# Patient Record
Sex: FEMALE | Race: WHITE | Employment: OTHER | URBAN - METROPOLITAN AREA
[De-identification: names, ages, dates, MRNs, and addresses within clinical notes are randomized per-mention and may not be internally consistent; named-entity substitution may affect disease eponyms.]

---

## 2018-01-03 PROBLEM — K43.2 INCISIONAL HERNIA, WITHOUT OBSTRUCTION OR GANGRENE: Status: ACTIVE | Noted: 2018-01-03

## 2018-02-05 PROBLEM — R10.11 CHRONIC RIGHT UPPER QUADRANT PAIN: Status: ACTIVE | Noted: 2018-02-05

## 2018-02-05 PROBLEM — G89.29 CHRONIC RIGHT UPPER QUADRANT PAIN: Status: ACTIVE | Noted: 2018-02-05

## 2018-07-09 ENCOUNTER — TELEPHONE (OUTPATIENT)
Dept: SURGERY | Age: 68
End: 2018-07-09

## 2018-07-09 ENCOUNTER — OFFICE VISIT (OUTPATIENT)
Dept: FAMILY MEDICINE CLINIC | Age: 68
End: 2018-07-09
Payer: MEDICARE

## 2018-07-09 ENCOUNTER — HOSPITAL ENCOUNTER (OUTPATIENT)
Age: 68
Discharge: HOME | End: 2018-07-11
Payer: MEDICARE

## 2018-07-09 VITALS
WEIGHT: 172 LBS | RESPIRATION RATE: 16 BRPM | DIASTOLIC BLOOD PRESSURE: 81 MMHG | HEART RATE: 76 BPM | BODY MASS INDEX: 32.47 KG/M2 | TEMPERATURE: 98.1 F | SYSTOLIC BLOOD PRESSURE: 134 MMHG | HEIGHT: 61 IN | OXYGEN SATURATION: 97 %

## 2018-07-09 DIAGNOSIS — I10 ESSENTIAL HYPERTENSION: ICD-10-CM

## 2018-07-09 DIAGNOSIS — E78.2 MIXED HYPERLIPIDEMIA: Primary | ICD-10-CM

## 2018-07-09 DIAGNOSIS — E78.2 MIXED HYPERLIPIDEMIA: ICD-10-CM

## 2018-07-09 DIAGNOSIS — K21.9 GASTROESOPHAGEAL REFLUX DISEASE, ESOPHAGITIS PRESENCE NOT SPECIFIED: ICD-10-CM

## 2018-07-09 DIAGNOSIS — R13.19 ESOPHAGEAL DYSPHAGIA: ICD-10-CM

## 2018-07-09 LAB
ALBUMIN SERPL-MCNC: 4.2 G/DL (ref 3.5–5.2)
ALP BLD-CCNC: 80 U/L (ref 35–104)
ALT SERPL-CCNC: 16 U/L (ref 0–32)
ANION GAP SERPL CALCULATED.3IONS-SCNC: 18 MMOL/L (ref 7–16)
AST SERPL-CCNC: 28 U/L (ref 0–31)
BILIRUB SERPL-MCNC: 0.7 MG/DL (ref 0–1.2)
BUN BLDV-MCNC: 21 MG/DL (ref 8–23)
CALCIUM SERPL-MCNC: 9.3 MG/DL (ref 8.6–10.2)
CHLORIDE BLD-SCNC: 101 MMOL/L (ref 98–107)
CHOLESTEROL, TOTAL: 162 MG/DL (ref 0–199)
CO2: 21 MMOL/L (ref 22–29)
CREAT SERPL-MCNC: 1 MG/DL (ref 0.5–1)
GFR AFRICAN AMERICAN: >60
GFR NON-AFRICAN AMERICAN: 55 ML/MIN/1.73
GLUCOSE BLD-MCNC: 83 MG/DL (ref 74–109)
HCT VFR BLD CALC: 42.9 % (ref 34–48)
HDLC SERPL-MCNC: 39 MG/DL
HEMOGLOBIN: 14.3 G/DL (ref 11.5–15.5)
LDL CHOLESTEROL CALCULATED: 86 MG/DL (ref 0–99)
MCH RBC QN AUTO: 31.4 PG (ref 26–35)
MCHC RBC AUTO-ENTMCNC: 33.3 % (ref 32–34.5)
MCV RBC AUTO: 94.1 FL (ref 80–99.9)
PDW BLD-RTO: 13.6 FL (ref 11.5–15)
PLATELET # BLD: 260 E9/L (ref 130–450)
PMV BLD AUTO: 9.9 FL (ref 7–12)
POTASSIUM SERPL-SCNC: 4.5 MMOL/L (ref 3.5–5)
RBC # BLD: 4.56 E12/L (ref 3.5–5.5)
SODIUM BLD-SCNC: 140 MMOL/L (ref 132–146)
TOTAL PROTEIN: 7.9 G/DL (ref 6.4–8.3)
TRIGL SERPL-MCNC: 186 MG/DL (ref 0–149)
TSH SERPL DL<=0.05 MIU/L-ACNC: 1.79 UIU/ML (ref 0.27–4.2)
VLDLC SERPL CALC-MCNC: 37 MG/DL
WBC # BLD: 7 E9/L (ref 4.5–11.5)

## 2018-07-09 PROCEDURE — 4040F PNEUMOC VAC/ADMIN/RCVD: CPT | Performed by: NURSE PRACTITIONER

## 2018-07-09 PROCEDURE — 84443 ASSAY THYROID STIM HORMONE: CPT

## 2018-07-09 PROCEDURE — G8417 CALC BMI ABV UP PARAM F/U: HCPCS | Performed by: NURSE PRACTITIONER

## 2018-07-09 PROCEDURE — 80061 LIPID PANEL: CPT

## 2018-07-09 PROCEDURE — 3017F COLORECTAL CA SCREEN DOC REV: CPT | Performed by: NURSE PRACTITIONER

## 2018-07-09 PROCEDURE — 1123F ACP DISCUSS/DSCN MKR DOCD: CPT | Performed by: NURSE PRACTITIONER

## 2018-07-09 PROCEDURE — 80053 COMPREHEN METABOLIC PANEL: CPT

## 2018-07-09 PROCEDURE — G8427 DOCREV CUR MEDS BY ELIG CLIN: HCPCS | Performed by: NURSE PRACTITIONER

## 2018-07-09 PROCEDURE — 99214 OFFICE O/P EST MOD 30 MIN: CPT | Performed by: NURSE PRACTITIONER

## 2018-07-09 PROCEDURE — 1090F PRES/ABSN URINE INCON ASSESS: CPT | Performed by: NURSE PRACTITIONER

## 2018-07-09 PROCEDURE — 85027 COMPLETE CBC AUTOMATED: CPT

## 2018-07-09 PROCEDURE — 1036F TOBACCO NON-USER: CPT | Performed by: NURSE PRACTITIONER

## 2018-07-09 PROCEDURE — G8400 PT W/DXA NO RESULTS DOC: HCPCS | Performed by: NURSE PRACTITIONER

## 2018-07-09 RX ORDER — AMLODIPINE BESYLATE 5 MG/1
TABLET ORAL
Qty: 30 TABLET | Refills: 0
Start: 2018-07-09 | End: 2019-01-24 | Stop reason: SDUPTHER

## 2018-07-09 RX ORDER — PANTOPRAZOLE SODIUM 20 MG/1
20 TABLET, DELAYED RELEASE ORAL DAILY
Qty: 30 TABLET | Refills: 3 | Status: SHIPPED | OUTPATIENT
Start: 2018-07-09 | End: 2018-10-03 | Stop reason: SDUPTHER

## 2018-07-09 NOTE — PROGRESS NOTES
activity change, appetite change, chills, diaphoresis, fatigue, fever and unexpected weight change. HENT: Positive for trouble swallowing. Negative for congestion, drooling, hearing loss, mouth sores, nosebleeds and voice change. Eyes: Negative for photophobia, pain, discharge, redness, itching and visual disturbance. Respiratory: Negative for apnea, cough, choking, chest tightness, shortness of breath, wheezing and stridor. Cardiovascular: Negative for chest pain, palpitations and leg swelling. Gastrointestinal: Positive for abdominal pain (epigastric tenderness). Negative for abdominal distention, anal bleeding, blood in stool, constipation, diarrhea, nausea, rectal pain and vomiting. C/o  GERD   Endocrine: Negative for cold intolerance, heat intolerance, polydipsia, polyphagia and polyuria. Genitourinary: Negative for decreased urine volume, difficulty urinating, dysuria, enuresis, flank pain, frequency, hematuria and urgency. Musculoskeletal: Negative for arthralgias, joint swelling and myalgias. Skin: Negative for color change, pallor and rash. Neurological: Negative for dizziness, tremors, seizures, syncope, facial asymmetry, speech difficulty, weakness, light-headedness, numbness and headaches. H/o MS-in remission   Hematological: Negative for adenopathy. Does not bruise/bleed easily. Psychiatric/Behavioral: Negative for agitation, behavioral problems, confusion, decreased concentration, dysphoric mood, hallucinations, self-injury, sleep disturbance and suicidal ideas. The patient is not nervous/anxious and is not hyperactive.         Physical Exam:    VS:  /81 (Site: Left Arm, Position: Sitting, Cuff Size: Medium Adult)   Pulse 76   Temp 98.1 °F (36.7 °C) (Oral)   Resp 16   Ht 5' 1\" (1.549 m)   Wt 172 lb (78 kg)   SpO2 97%   BMI 32.50 kg/m²   LAST WEIGHT:  Wt Readings from Last 3 Encounters:   07/09/18 172 lb (78 kg)   02/20/18 170 lb (77.1 kg)   01/03/18 173

## 2018-07-09 NOTE — TELEPHONE ENCOUNTER
Patient was referred by Katelynn Pringle CNP for esophageal dysphagia, GERD consult. Patient was scheduled on 08/29/18 in [facility] office @ 1:00pm with Dr. Danielle Esquivel. Patient instructed that information packet with appointment letter and health questionnaire will be put in the mail today. Patient instructed to complete health questionnaire prior to scheduled appointment and use return envelope in packet to mail it in; if patient cannot mail it back to us prior to appointment, pt instructed to bring it with her to scheduled appt on 08/29/18. Patient also instructed that a co-pay is due at the time of visit, and if the patient is not insured there is a $40.00 fee at the time of the appointment. Patient was instructed to bring a photo ID, insurance card (if applicable), and list of any current medications. Patient instructed to arrive 15 minutes prior for registration. Patient verbalized understanding of instructions.     Electronically signed by Andrew Mendez CMA on 7/9/18 at 3:05 PM

## 2018-07-09 NOTE — PATIENT INSTRUCTIONS
Patient Education        Learning About the Diet for Swallowing Problems  What are swallowing problems? Difficulty swallowing is also called dysphagia (say \"ajw-EMP-hdr-uh\"). It is most often a sign of a problem with your throat or esophagus. This is the tube that moves food and liquids from the back of your mouth to your stomach. Trouble swallowing can occur when the muscles and nerves that move food through the throat and esophagus are not working right. To help you swallow food, your doctor or speech therapist may advise a special dysphagia diet for you. Why is a special diet important? A dysphagia diet can help you handle some problems that can occur when it's hard to swallow food and liquids easily. These problems can include:  · Malnutrition. This means you aren't getting enough healthy foods to keep your body working well. · Dehydration. This means you aren't getting enough liquids to keep your body healthy. · Aspiration. This means that food, liquid, or saliva goes down your windpipe (trachea) into your lungs, instead of down your esophagus to your stomach. This can lead to aspiration pneumonia, which is an inflammation of the lungs. What is the dysphagia diet? In the dysphagia diet, you change the foods you eat and the liquids you drink to make it easier to swallow them. You may:  · Change the texture of the foods you eat. Your doctor or speech therapist may advise you to eat one of these types of foods:  ¨ Solid, soft foods that have been cut up into small pieces. Extra gravy or sauce can help make these foods easier to swallow. ¨ Semi-solid foods, like ground meats or fruits and vegetables that are mashed with a fork. These foods require some chewing. Extra gravy or sauce is often served. ¨ Foods that are the texture of pudding. You don't have to chew these foods. Examples include mashed potatoes with gravy; yogurt; pudding; and pureed meats, fruits, and vegetables.   · Thicken the liquids you drink. Your doctor or speech therapist will tell you what kind of thickener to use and how thick to make the liquids. ¨ Nectar-thick liquids leave a thin coating when they are poured from a spoon. ¨ Honey-thick liquids drip slowly when they are poured from a spoon. ¨ Pudding-thick liquids do not pour from a spoon. Your speech therapist will help you learn exercises to train your muscles to work together so you can swallow. You may also need to learn how to position your body or how to put food in your mouth to be able to swallow better. Some people have severe trouble swallowing and can't get enough food and liquids. In those cases, the doctor can insert a feeding tube so the person gets enough nutrients. Follow-up care is a key part of your treatment and safety. Be sure to make and go to all appointments, and call your doctor if you are having problems. It's also a good idea to know your test results and keep a list of the medicines you take. Where can you learn more? Go to [email or web address] org and sign in to your [employer] account. Enter J477 in the [facility] box to learn more about \"Learning About the Diet for Swallowing Problems. \"     If you do not have an account, please click on the \"Sign Up Now\" link. Current as of: November 20, 2017  Content Version: 11.6  © 8064-2507 [employer]. Care instructions adapted under license by [facility] ([facility]). If you have questions about a medical condition or this instruction, always ask your healthcare professional. [place] [address] any warranty or liability for your use of this information. Patient Education        Learning About Swallowing Problems  What are swallowing problems? Certain health problems that affect the nervous system can cause trouble swallowing. These conditions include stroke, ALS (also known as Rosalina Gehrig's disease), Parkinson's disease, and multiple sclerosis.  The muscles and you think you are having a problem with your medicine. · Talk to your doctor before you start taking aspirin every day. Aspirin can help certain people lower their risk of a heart attack or stroke. But taking aspirin isn't right for everyone, because it can cause serious bleeding. · See your doctor regularly. You may need to see the doctor more often at first or until your blood pressure comes down. · If you are taking blood pressure medicine, talk to your doctor before you take decongestants or anti-inflammatory medicine, such as ibuprofen. Some of these medicines can raise blood pressure. · Learn how to check your blood pressure at home. Lifestyle changes  · Stay at a healthy weight. This is especially important if you put on weight around the waist. Losing even 10 pounds can help you lower your blood pressure. · If your doctor recommends it, get more exercise. Walking is a good choice. Bit by bit, increase the amount you walk every day. Try for at least 30 minutes on most days of the week. You also may want to swim, bike, or do other activities. · Avoid or limit alcohol. Talk to your doctor about whether you can drink any alcohol. · Try to limit how much sodium you eat to less than 2,300 milligrams (mg) a day. Your doctor may ask you to try to eat less than 1,500 mg a day. · Eat plenty of fruits (such as bananas and oranges), vegetables, legumes, whole grains, and low-fat dairy products. · Lower the amount of saturated fat in your diet. Saturated fat is found in animal products such as milk, cheese, and meat. Limiting these foods may help you lose weight and also lower your risk for heart disease. · Do not smoke. Smoking increases your risk for heart attack and stroke. If you need help quitting, talk to your doctor about stop-smoking programs and medicines. These can increase your chances of quitting for good. When should you call for help? Call 911 anytime you think you may need emergency care.  This about \"High Blood Pressure: Care Instructions. \"     If you do not have an account, please click on the \"Sign Up Now\" link. Current as of: December 6, 2017  Content Version: 11.6  © [id number] [employer]. Care instructions adapted under license by [facility] ([facility]). If you have questions about a medical condition or this instruction, always ask your healthcare professional. [place] [address] any warranty or liability for your use of this information. Patient Education        High Cholesterol: Care Instructions  Your Care Instructions    Cholesterol is a type of fat in your blood. It is needed for many body functions, such as making new cells. Cholesterol is made by your body. It also comes from food you eat. High cholesterol means that you have too much of the fat in your blood. This raises your risk of a heart attack and stroke. LDL and HDL are part of your total cholesterol. LDL is the \"bad\" cholesterol. High LDL can raise your risk for heart disease, heart attack, and stroke. HDL is the \"good\" cholesterol. It helps clear bad cholesterol from the body. High HDL is linked with a lower risk of heart disease, heart attack, and stroke. Your cholesterol levels help your doctor find out your risk for having a heart attack or stroke. You and your doctor can talk about whether you need to lower your risk and what treatment is best for you. A heart-healthy lifestyle along with medicines can help lower your cholesterol and your risk. The way you choose to lower your risk will depend on how high your risk is for heart attack and stroke. It will also depend on how you feel about taking medicines. Follow-up care is a key part of your treatment and safety. Be sure to make and go to all appointments, and call your doctor if you are having problems. It's also a good idea to know your test results and keep a list of the medicines you take. How can you care for yourself at home?   · Eat a variety

## 2018-07-10 ASSESSMENT — ENCOUNTER SYMPTOMS
CHEST TIGHTNESS: 0
RECTAL PAIN: 0
COLOR CHANGE: 0
ANAL BLEEDING: 0
BLOOD IN STOOL: 0
APNEA: 0
EYE REDNESS: 0
ABDOMINAL DISTENTION: 0
EYE PAIN: 0
DIARRHEA: 0
EYE ITCHING: 0
CHOKING: 0
TROUBLE SWALLOWING: 1
WHEEZING: 0
STRIDOR: 0
CONSTIPATION: 0
VOICE CHANGE: 0
VOMITING: 0
COUGH: 0
NAUSEA: 0
PHOTOPHOBIA: 0
EYE DISCHARGE: 0
SHORTNESS OF BREATH: 0
ABDOMINAL PAIN: 1

## 2018-07-13 ENCOUNTER — TELEPHONE (OUTPATIENT)
Dept: FAMILY MEDICINE CLINIC | Age: 68
End: 2018-07-13

## 2018-07-16 DIAGNOSIS — E78.00 PURE HYPERCHOLESTEROLEMIA: ICD-10-CM

## 2018-07-16 RX ORDER — PAROXETINE 30 MG/1
TABLET, FILM COATED ORAL
Qty: 90 TABLET | Refills: 1 | Status: SHIPPED | OUTPATIENT
Start: 2018-07-16 | End: 2019-02-08 | Stop reason: ALTCHOICE

## 2018-07-16 RX ORDER — MELOXICAM 15 MG/1
TABLET ORAL
Qty: 90 TABLET | Refills: 1 | Status: SHIPPED | OUTPATIENT
Start: 2018-07-16 | End: 2019-02-13

## 2018-07-16 RX ORDER — SIMVASTATIN 40 MG
40 TABLET ORAL NIGHTLY
Qty: 90 TABLET | Refills: 1 | Status: SHIPPED | OUTPATIENT
Start: 2018-07-16 | End: 2019-02-13 | Stop reason: SDUPTHER

## 2018-08-29 ENCOUNTER — OFFICE VISIT (OUTPATIENT)
Dept: SURGERY | Age: 68
End: 2018-08-29
Payer: MEDICARE

## 2018-08-29 VITALS
RESPIRATION RATE: 17 BRPM | HEART RATE: 88 BPM | BODY MASS INDEX: 32.85 KG/M2 | WEIGHT: 174 LBS | HEIGHT: 61 IN | DIASTOLIC BLOOD PRESSURE: 102 MMHG | SYSTOLIC BLOOD PRESSURE: 160 MMHG | OXYGEN SATURATION: 96 %

## 2018-08-29 DIAGNOSIS — K43.9 HERNIA OF ANTERIOR ABDOMINAL WALL: Primary | ICD-10-CM

## 2018-08-29 DIAGNOSIS — K21.9 GASTROESOPHAGEAL REFLUX DISEASE, ESOPHAGITIS PRESENCE NOT SPECIFIED: ICD-10-CM

## 2018-08-29 PROCEDURE — 1036F TOBACCO NON-USER: CPT | Performed by: SURGERY

## 2018-08-29 PROCEDURE — G8400 PT W/DXA NO RESULTS DOC: HCPCS | Performed by: SURGERY

## 2018-08-29 PROCEDURE — 1090F PRES/ABSN URINE INCON ASSESS: CPT | Performed by: SURGERY

## 2018-08-29 PROCEDURE — G8417 CALC BMI ABV UP PARAM F/U: HCPCS | Performed by: SURGERY

## 2018-08-29 PROCEDURE — 3017F COLORECTAL CA SCREEN DOC REV: CPT | Performed by: SURGERY

## 2018-08-29 PROCEDURE — 4040F PNEUMOC VAC/ADMIN/RCVD: CPT | Performed by: SURGERY

## 2018-08-29 PROCEDURE — 1123F ACP DISCUSS/DSCN MKR DOCD: CPT | Performed by: SURGERY

## 2018-08-29 PROCEDURE — 99204 OFFICE O/P NEW MOD 45 MIN: CPT | Performed by: SURGERY

## 2018-08-29 PROCEDURE — 1101F PT FALLS ASSESS-DOCD LE1/YR: CPT | Performed by: SURGERY

## 2018-08-29 PROCEDURE — G8427 DOCREV CUR MEDS BY ELIG CLIN: HCPCS | Performed by: SURGERY

## 2018-08-29 NOTE — PATIENT INSTRUCTIONS
explain your symptoms. You and your doctor will talk about the results and your treatment plan. Call Your Doctor   After the test, call your doctor if any of the following occurs:   Signs of infection, including fever and chills   Severe abdominal pain   Hard, swollen abdomen   Difficulty swallowing or breathing   Any change or increase in your original symptoms   Bloody or black tarry colored stools   Nausea and/or vomiting   Cough, shortness of breath, or chest pain   Bleeding     In case of emergency, call 911.

## 2018-08-29 NOTE — PROGRESS NOTES
[facility]    General Surgery Attending History and Physical    Patient's Name/Date of Birth: Ulises Cedeño / 1950 (57 y.o.)    Date: August 29, 2018     CC: incisional hernia    HPI:  80 yo complains of hernia. She notes that she has had this hernia on her abdomen x 5 years. No abdominal pain related to the hernia. She has put on 20 pounds since quitting smoking 1 year ago. Over the past year, she has noted that the incisional hernia has increased in size. Since starting on protonix 2 months ago, she has no symptoms from her heartburn. She has no burning in her chest. She can lay down and not feel nauseated. She had prior diverticulitis surgery by dr Janeth Allen 12 years ago. She has never had a colonoscopy and declines to have one. She has a family hx of esophageal cancer (father)  No weight loss.   No blood per rectum  No change in appetite    Past Medical History:   Diagnosis Date    Allergic rhinitis     Anxiety     Dyslexia     GERD (gastroesophageal reflux disease)     Hyperlipidemia     Hypertension     Multiple sclerosis ([facility] [address] 1980    Osteoporosis     Sleep disorder        Past Surgical History:   Procedure Laterality Date    COLECTOMY      diverticulitis     TONSILLECTOMY AND ADENOIDECTOMY      TUBAL LIGATION      WISDOM TOOTH EXTRACTION         Current Outpatient Prescriptions   Medication Sig Dispense Refill    simvastatin (ZOCOR) 40 MG tablet Take 1 tablet by mouth nightly 90 tablet 1    PARoxetine (PAXIL) 30 MG tablet take 1 tablet by mouth once daily 90 tablet 1    meloxicam (MOBIC) 15 MG tablet take 1 tablet by mouth once daily if needed for pain 90 tablet 1    pantoprazole (PROTONIX) 20 MG tablet Take 1 tablet by mouth daily 30 tablet 3    amLODIPine (NORVASC) 5 MG tablet 1/2 tab po daily 30 tablet 0    diphenhydrAMINE (BENADRYL) 25 MG capsule Take 25 mg by mouth every 6 hours as needed for Itching      varenicline (CHANTIX CONTINUING MONTH JOSE G) 1 Breath sounds were clear and equal with no rales, wheezes, or rhonchi. Respiratory effort was normal with no retractions or use of accessory muscles. CV: Heart sounds were normal with a regular rate and rhythm. No pedal edema  GI/ Abdomen: The abdomen was soft and non distended. There was no tenderness, guarding, rebound, or rigidity. There was no                     masses, hepatosplenomegaly. Upper midline--reducible incisional hernia  No inguinal hernias were noted on coughing and straining. MSK: no clubbing/ no cyanosis/ gait normal       Assessment/Plan:  --Reducible incisional hernia--increased in size. Pt is unsure if she wants it fixed. Will observe for now    --Esophageal reflux--sx improved with protonix  Family hx of esophageal cancer  Plan on EGD. I have discussed the risks, benefits, and alternatives to esophagogastroduodenoscopy with possible biopsy with deep sedation with the patient. I have detailed the risks of deep sedation (hypotension, hypoxia) as well as complications of bleeding and perforation. The patient understands the above and agrees to proceed. --No prior colonoscopy.   Counseled pt on the importance of having a colonoscopy but she declines         Kecia Choe MD, FACS  8/29/2018  1:42 PM

## 2018-08-29 NOTE — PROGRESS NOTES
Kenny Rojo is scheduled for an EGD with Dr Karen Cunningham on 9/26/18 @ 10:30am @ [facility]. Patient has been notified of the appointment and a letter has been mailed and/or given to patient in clinic. Patient has also been given verbal instruction to stop blood thinners 5 days before, take blood pressure medicine the morning before with a small sip of water. Patient has been told to make sure they have a ride and to park in the [facility] and report through the outpatient entrance of the hospital facing [place] for same day surgery.     Electronically signed by Dariusz Oconnor on 8/29/18 at 1:55 PM

## 2018-09-25 NOTE — PROGRESS NOTES
[facility] PRE-ADMISSION TESTING GENERAL INSTRUCTIONS- [facility]-phone number:[phone]    GENERAL INSTRUCTIONS      LEFT INSTRUCTIONS ON PATIENTS PHONE ANSWERING MACHINE 09/25/2018 1000    [x] Antibacterial Soap shower Night before and/or AM of Surgery  [] Jerome wipe instruction sheet and wipes given. [x] Nothing by mouth after midnight, including gum, candy, mints, or water.   [] You may brush your teeth, gargle, but do NOT swallow water. []Hibiclens shower  the night before and the morning of surgery. Do not use             Hibiclens on your face or head. [x]No smoking, chewing tobacco, illegal drugs, or alcohol within 24 hours of your surgery. [x] Jewelry, valuables or body piercing's should not be brought to the hospital. All body and/or tongue piercing's must be removed prior to arriving to hospital.  ALL hair pins must be removed. [x] Do not wear makeup, lotions, powders, deodorant. Nail polish as directed by the nurse. [x] Arrange transportation to and from the hospital.  Arrange for someone to be with you for the remainder of the day and for 24 hours after your procedure due to having had anesthesia. [x] Bring insurance card and photo ID.  [] Transfusion Bracelet: Please bring with you to hospital, day of surgery  [] Bring urine specimen day of surgery. Any small container is acceptable. [] Use inhalers the morning of surgery and bring with you to hospital.   []Bring copy of living will or healthcare power of  papers to be placed in your electronic record. [] CPAP/BI-PAP: Please bring your machine if you are to spend the night in the hospital.     ENDOSCOPY INSTRUCTIONS:   [] Bowel prep instructions reviewed. [] Nothing by mouth after midnight, including gum, candy, mints, or water.  [] You may brush your teeth, gargle, but do NOT swallow water. [] Do not wear makeup, lotions, powders, deodorant. Nail polish as directed by the nurse.   [] Arrange symptomatic, you may drink 1-2 ounces of apple juice or take a glucose tablet. The morning of your procedure, you may call the pre-op area if you have concerns about your blood sugar [phone]. [] Use your inhalers the morning of surgery. Bring your emergency inhaler with you day of surgery. [] Follow physician instructions regarding any blood thinners you may be taking. WHAT TO EXPECT:  [] The day of surgery you will be greeted and  checked in by the [employer] .  A nurse will greet you in accordance to the time you are needed in the pre-op area to prepare you for surgery. Please do not be discouraged if you are not greeted in the order you arrive as there are many variables that are involved in patient preparation. Your patience is greatly appreciated as you wait for your nurse. Please bring in items such as: books, magazines, newspapers, electronics, or any other items  to occupy your time in the waiting area. []  Delays may occur with surgery and staff will make a sincere effort to keep you informed of delays. If any delays occur with your procedure, we apologize ahead of time for your inconvenience as we recognize the value of your time.

## 2018-09-26 ENCOUNTER — ANESTHESIA (OUTPATIENT)
Dept: ENDOSCOPY | Age: 68
End: 2018-09-26
Payer: MEDICARE

## 2018-09-26 ENCOUNTER — HOSPITAL ENCOUNTER (OUTPATIENT)
Age: 68
Setting detail: OUTPATIENT SURGERY
Discharge: HOME | End: 2018-09-26
Attending: SURGERY | Admitting: SURGERY
Payer: MEDICARE

## 2018-09-26 ENCOUNTER — ANESTHESIA EVENT (OUTPATIENT)
Dept: ENDOSCOPY | Age: 68
End: 2018-09-26
Payer: MEDICARE

## 2018-09-26 VITALS
HEIGHT: 61 IN | OXYGEN SATURATION: 100 % | BODY MASS INDEX: 32.85 KG/M2 | SYSTOLIC BLOOD PRESSURE: 150 MMHG | RESPIRATION RATE: 18 BRPM | TEMPERATURE: 97 F | HEART RATE: 76 BPM | WEIGHT: 174 LBS | DIASTOLIC BLOOD PRESSURE: 88 MMHG

## 2018-09-26 VITALS
DIASTOLIC BLOOD PRESSURE: 123 MMHG | RESPIRATION RATE: 10 BRPM | OXYGEN SATURATION: 93 % | SYSTOLIC BLOOD PRESSURE: 196 MMHG

## 2018-09-26 PROCEDURE — 88305 TISSUE EXAM BY PATHOLOGIST: CPT

## 2018-09-26 PROCEDURE — 3609012400 HC EGD TRANSORAL BIOPSY SINGLE/MULTIPLE: Performed by: SURGERY

## 2018-09-26 PROCEDURE — 7100000010 HC PHASE II RECOVERY - FIRST 15 MIN: Performed by: SURGERY

## 2018-09-26 PROCEDURE — 6360000002 HC RX W HCPCS: Performed by: PHYSICIAN ASSISTANT

## 2018-09-26 PROCEDURE — 3700000000 HC ANESTHESIA ATTENDED CARE: Performed by: SURGERY

## 2018-09-26 PROCEDURE — 2580000003 HC RX 258: Performed by: SURGERY

## 2018-09-26 PROCEDURE — 3700000001 HC ADD 15 MINUTES (ANESTHESIA): Performed by: SURGERY

## 2018-09-26 PROCEDURE — 88342 IMHCHEM/IMCYTCHM 1ST ANTB: CPT

## 2018-09-26 PROCEDURE — 43239 EGD BIOPSY SINGLE/MULTIPLE: CPT | Performed by: SURGERY

## 2018-09-26 PROCEDURE — 7100000011 HC PHASE II RECOVERY - ADDTL 15 MIN: Performed by: SURGERY

## 2018-09-26 RX ORDER — PROPOFOL 10 MG/ML
INJECTION, EMULSION INTRAVENOUS PRN
Status: DISCONTINUED | OUTPATIENT
Start: 2018-09-26 | End: 2018-09-26 | Stop reason: SDUPTHER

## 2018-09-26 RX ORDER — SODIUM CHLORIDE 9 MG/ML
INJECTION, SOLUTION INTRAVENOUS CONTINUOUS
Status: DISCONTINUED | OUTPATIENT
Start: 2018-09-26 | End: 2018-09-26 | Stop reason: HOSPADM

## 2018-09-26 RX ORDER — 0.9 % SODIUM CHLORIDE 0.9 %
10 VIAL (ML) INJECTION PRN
Status: DISCONTINUED | OUTPATIENT
Start: 2018-09-26 | End: 2018-09-26 | Stop reason: HOSPADM

## 2018-09-26 RX ORDER — 0.9 % SODIUM CHLORIDE 0.9 %
10 VIAL (ML) INJECTION EVERY 12 HOURS SCHEDULED
Status: DISCONTINUED | OUTPATIENT
Start: 2018-09-26 | End: 2018-09-26 | Stop reason: HOSPADM

## 2018-09-26 RX ADMIN — SODIUM CHLORIDE: 9 INJECTION, SOLUTION INTRAVENOUS at 10:33

## 2018-09-26 RX ADMIN — PROPOFOL 150 MG: 10 INJECTION, EMULSION INTRAVENOUS at 10:45

## 2018-09-26 ASSESSMENT — PULMONARY FUNCTION TESTS
PIF_VALUE: 0

## 2018-09-26 ASSESSMENT — PAIN SCALES - GENERAL
PAINLEVEL_OUTOF10: 0

## 2018-09-26 ASSESSMENT — PAIN - FUNCTIONAL ASSESSMENT: PAIN_FUNCTIONAL_ASSESSMENT: 0-10

## 2018-09-26 NOTE — ANESTHESIA POSTPROCEDURE EVALUATION
Department of Anesthesiology  Postprocedure Note    Patient: Arcadio Or  MRN: [id number]  YOB: 1950  Date of evaluation: 9/26/2018  Time:  12:36 PM     Procedure Summary     Date:  09/26/18 Room / Location:  [facility] ENDO 03 / [facility]    Anesthesia Start:  1384 Anesthesia Stop:  1055    Procedure:  EGD BIOPSY (N/A ) Diagnosis:  (FAMILY HX OF ESOPHAGEAL CA, REFLUX)    Surgeon:  Serjio Milligan MD Responsible Provider:  Leonela Ji MD    Anesthesia Type:  MAC ASA Status:  3          Anesthesia Type: MAC    Ben Phase I: Ben Score: 10    Ben Phase II: Ben Score: 10    Last vitals: Reviewed and per EMR flowsheets.        Anesthesia Post Evaluation    Patient location during evaluation: PACU  Patient participation: complete - patient participated  Level of consciousness: awake  Pain score: 2  Airway patency: patent  Nausea & Vomiting: no nausea  Complications: no  Cardiovascular status: blood pressure returned to baseline  Respiratory status: acceptable  Hydration status: euvolemic

## 2018-09-26 NOTE — ANESTHESIA PRE PROCEDURE
Body mass index is 32.88 kg/m². CBC:   Lab Results   Component Value Date    WBC 7.0 07/09/2018    RBC 4.56 07/09/2018    HGB 14.3 07/09/2018    HCT 42.9 07/09/2018    MCV 94.1 07/09/2018    RDW 13.6 07/09/2018     07/09/2018       CMP:   Lab Results   Component Value Date     07/09/2018    K 4.5 07/09/2018     07/09/2018    CO2 21 07/09/2018    BUN 21 07/09/2018    CREATININE 1.0 07/09/2018    GFRAA >60 07/09/2018    LABGLOM 55 07/09/2018    GLUCOSE 83 07/09/2018    PROT 7.9 07/09/2018    CALCIUM 9.3 07/09/2018    BILITOT 0.7 07/09/2018    ALKPHOS 80 07/09/2018    AST 28 07/09/2018    ALT 16 07/09/2018       POC Tests: No results for input(s): POCGLU, POCNA, POCK, POCCL, POCBUN, POCHEMO, POCHCT in the last 72 hours. Coags: No results found for: PROTIME, INR, APTT    HCG (If Applicable): No results found for: PREGTESTUR, PREGSERUM, HCG, HCGQUANT     ABGs: No results found for: PHART, PO2ART, ZTT8SMD, BGX1AKM, BEART, N1IPVVQJ     Type & Screen (If Applicable):  No results found for: LABABO, [address]    Anesthesia Evaluation  Patient summary reviewed and Nursing notes reviewed no history of anesthetic complications:   Airway: Mallampati: III  TM distance: >3 FB   Neck ROM: full  Mouth opening: > = 3 FB Dental:      Comment: Denies loose, chipped, cracked teeth     Pulmonary:       Smoker: quit smoking in January 2018. Cardiovascular:    (+) hypertension:, hyperlipidemia                  Neuro/Psych:                ROS comment: MS GI/Hepatic/Renal:   (+) GERD (on medication ): well controlled,          ROS comment: Incisional hernia - from surgery 10 yrs ago . Endo/Other:                      ROS comment: Osteoporosis  Abdominal:           Vascular:                                      Anesthesia Plan      MAC     ASA 3       Induction: intravenous. Anesthetic plan and risks discussed with patient.       Plan discussed with CRNA and attending.                   Hermann Prescott RN   9/26/2018

## 2018-09-26 NOTE — H&P
[address] [facility] [place] [facility] [address]    General Surgery Attending History and Physical    Patient's Name/Date of Birth: Branden Berg / 1950 (62 y.o.)    Date: September 26, 2018     CC: incisional hernia    HPI:  80 yo complains of hernia. She notes that she has had this hernia on her abdomen x 5 years. No abdominal pain related to the hernia. She has put on 20 pounds since quitting smoking 1 year ago. Over the past year, she has noted that the incisional hernia has increased in size. Since starting on protonix 2 months ago, she has no symptoms from her heartburn. She has no burning in her chest. She can lay down and not feel nauseated. She had prior diverticulitis surgery by dr Socorro Lyons 12 years ago. She has never had a colonoscopy and declines to have one. She has a family hx of esophageal cancer (father)  No weight loss. No blood per rectum  No change in appetite    Past Medical History:   Diagnosis Date    Allergic rhinitis     Anxiety     Dyslexia     GERD (gastroesophageal reflux disease)     Hyperlipidemia     Hypertension     Multiple sclerosis ([facility] [address] 1980    Osteoporosis     Sleep disorder        Past Surgical History:   Procedure Laterality Date    COLECTOMY      diverticulitis     TONSILLECTOMY AND ADENOIDECTOMY      TUBAL LIGATION      WISDOM TOOTH EXTRACTION         No current facility-administered medications for this encounter. Allergies   Allergen Reactions    Tetracyclines & Related Hives    Z-Anant [Azithromycin] Palpitations    Bee Venom        Family History   Problem Relation Age of Onset    Cancer Father         esophageal    Diabetes Mother     Alzheimer's Disease Mother        Social History     Social History    Marital status:      Spouse name: N/A    Number of children: N/A    Years of education: N/A     Occupational History    Not on file.      Social History Main Topics    Smoking status: Former Smoker     Packs/day: 1.00     Years: 43.00 Types: Cigarettes     Start date: 1/1/1971     Quit date: 1/2/2018    Smokeless tobacco: Never Used    Alcohol use 0.0 oz/week      Comment: twice a year    Drug use: No    Sexual activity: Not on file     Other Topics Concern    Not on file     Social History Narrative    No narrative on file       ROS:  Review of Systems - History obtained from the patient  General ROS: negative  Psychological ROS: negative  Ophthalmic ROS: negative  Allergy and Immunology ROS: negative  Hematological and Lymphatic ROS: negative  Endocrine ROS: negative  Breast ROS: negative  Respiratory ROS: negative  Cardiovascular ROS: negative  Gastrointestinal ROS: positive for - abdominal pain   Genito-Urinary ROS: negative  Musculoskeletal ROS: negative        Physical Exam:  There were no vitals filed for this visit. PSYCH: mood and affect normal, alert and oriented x 3  CONSTITUTIONAL: No apparent distress, comfortable  EYES: Sclera white, pupils equal round and reactive to light  ENMT:  Hearing normal, trachea midline, ears externally intact  LYMPH: no lympadenopathy in neck. No lympadenopathy in groins  RESP: Breath sounds were clear and equal with no rales, wheezes, or rhonchi. Respiratory effort was normal with no retractions or use of accessory muscles. CV: Heart sounds were normal with a regular rate and rhythm. No pedal edema  GI/ Abdomen: The abdomen was soft and non distended. There was no tenderness, guarding, rebound, or rigidity. There was no                     masses, hepatosplenomegaly. Upper midline--reducible incisional hernia  No inguinal hernias were noted on coughing and straining. MSK: no clubbing/ no cyanosis/ gait normal       Assessment/Plan:  --Reducible incisional hernia--increased in size. Pt is unsure if she wants it fixed. Will observe for now    --Esophageal reflux--sx improved with protonix  Family hx of esophageal cancer  Plan on EGD.   I have discussed the

## 2018-10-03 DIAGNOSIS — K21.9 GASTROESOPHAGEAL REFLUX DISEASE, ESOPHAGITIS PRESENCE NOT SPECIFIED: ICD-10-CM

## 2018-10-03 DIAGNOSIS — R13.19 ESOPHAGEAL DYSPHAGIA: ICD-10-CM

## 2018-10-03 RX ORDER — PANTOPRAZOLE SODIUM 20 MG/1
20 TABLET, DELAYED RELEASE ORAL DAILY
Qty: 90 TABLET | Refills: 1 | Status: SHIPPED | OUTPATIENT
Start: 2018-10-03 | End: 2018-11-06 | Stop reason: SDUPTHER

## 2018-10-12 ENCOUNTER — TELEPHONE (OUTPATIENT)
Dept: SURGERY | Age: 68
End: 2018-10-12

## 2018-10-18 ENCOUNTER — TELEPHONE (OUTPATIENT)
Dept: SURGERY | Age: 68
End: 2018-10-18

## 2018-10-18 NOTE — TELEPHONE ENCOUNTER
SUJATHA Ramires received a return call from patient wanting to schedule an appointment that was requested to be scheduled. MA scheduled pt for 11/14/18 @ 1:15pm with Anderson Friend in [address] [place] [address]. Address verified & appointment letter sent. Patient wanted [place] off and was unable to do 11/07/18 so requested the next date which was 11/14/18. MA routing message to Johnny Scott for informational purposes.     Electronically signed by Emily Romo MA on 10/18/18 at 11:26 AM

## 2018-11-14 ENCOUNTER — OFFICE VISIT (OUTPATIENT)
Dept: SURGERY | Age: 68
End: 2018-11-14
Payer: MEDICARE

## 2018-11-14 VITALS
OXYGEN SATURATION: 97 % | RESPIRATION RATE: 18 BRPM | BODY MASS INDEX: 33.42 KG/M2 | SYSTOLIC BLOOD PRESSURE: 172 MMHG | WEIGHT: 177 LBS | HEIGHT: 61 IN | HEART RATE: 94 BPM | DIASTOLIC BLOOD PRESSURE: 92 MMHG

## 2018-11-14 DIAGNOSIS — K44.9 HIATAL HERNIA: ICD-10-CM

## 2018-11-14 PROCEDURE — 1101F PT FALLS ASSESS-DOCD LE1/YR: CPT | Performed by: SURGERY

## 2018-11-14 PROCEDURE — 4040F PNEUMOC VAC/ADMIN/RCVD: CPT | Performed by: SURGERY

## 2018-11-14 PROCEDURE — G8417 CALC BMI ABV UP PARAM F/U: HCPCS | Performed by: SURGERY

## 2018-11-14 PROCEDURE — 1090F PRES/ABSN URINE INCON ASSESS: CPT | Performed by: SURGERY

## 2018-11-14 PROCEDURE — 1123F ACP DISCUSS/DSCN MKR DOCD: CPT | Performed by: SURGERY

## 2018-11-14 PROCEDURE — 99212 OFFICE O/P EST SF 10 MIN: CPT | Performed by: SURGERY

## 2018-11-14 PROCEDURE — G8484 FLU IMMUNIZE NO ADMIN: HCPCS | Performed by: SURGERY

## 2018-11-14 PROCEDURE — 3017F COLORECTAL CA SCREEN DOC REV: CPT | Performed by: SURGERY

## 2018-11-14 PROCEDURE — 1036F TOBACCO NON-USER: CPT | Performed by: SURGERY

## 2018-11-14 PROCEDURE — G8400 PT W/DXA NO RESULTS DOC: HCPCS | Performed by: SURGERY

## 2018-11-14 PROCEDURE — G8427 DOCREV CUR MEDS BY ELIG CLIN: HCPCS | Performed by: SURGERY

## 2018-11-14 NOTE — PROGRESS NOTES
CC: epigastric pain/ incisional hernia    S. Pt is here for follow up. She is not having pain from her incisional hernia. She thought about it and does not want it repaired at this time. She is trying to lose weight. She has no epigastric pain since starting protonix 20 mg daily--4 months ago. She is eating well    Vitals:    11/14/18 1326   BP: (!) 172/92   Pulse: 94   Resp: 18   SpO2: 97%   PHYSICAL EXAM   PSYCH: mood and affect normal, alert and oriented x 3  CONSTITUTIONAL: No apparent distress, comfortable  EYES: Sclera white, pupils equal round and reactive to light  ENMT:  Hearing normal, trachea midline, ears externally intact  RESP: Breath sounds were clear and equal with no rales, wheezes, or rhonchi. Respiratory effort was normal with no retractions or use of accessory muscles. CV: Heart sounds were normal with a regular rate and rhythm. No pedal edema  GI/ Abdomen: The abdomen was soft and non distended. There was no tenderness, guarding, rebound, or rigidity. There was no                     masses, hepatosplenomegaly . Small upper incisional hernia--reducible    A/P  Incisional hernia--pt thought about it and does not want it repaired at this time.     Epigastric pain resolved  Hiatal hernia--asymptomatic with protonix 20 mg daily    Follow up in 6 months to discuss weaning protonix and re-evaulate incisional hernia    Electronically signed by Jose Ward MD on 11/14/2018 at 1:47 PM

## 2018-11-15 ENCOUNTER — OFFICE VISIT (OUTPATIENT)
Dept: FAMILY MEDICINE CLINIC | Age: 68
End: 2018-11-15
Payer: MEDICARE

## 2018-11-15 VITALS
SYSTOLIC BLOOD PRESSURE: 132 MMHG | DIASTOLIC BLOOD PRESSURE: 84 MMHG | TEMPERATURE: 98.1 F | HEIGHT: 61 IN | HEART RATE: 92 BPM | BODY MASS INDEX: 33.79 KG/M2 | OXYGEN SATURATION: 95 % | RESPIRATION RATE: 16 BRPM | WEIGHT: 179 LBS

## 2018-11-15 DIAGNOSIS — G56.01 RIGHT CARPAL TUNNEL SYNDROME: Primary | ICD-10-CM

## 2018-11-15 PROCEDURE — 3017F COLORECTAL CA SCREEN DOC REV: CPT | Performed by: NURSE PRACTITIONER

## 2018-11-15 PROCEDURE — 1036F TOBACCO NON-USER: CPT | Performed by: NURSE PRACTITIONER

## 2018-11-15 PROCEDURE — G8400 PT W/DXA NO RESULTS DOC: HCPCS | Performed by: NURSE PRACTITIONER

## 2018-11-15 PROCEDURE — 99213 OFFICE O/P EST LOW 20 MIN: CPT | Performed by: NURSE PRACTITIONER

## 2018-11-15 PROCEDURE — G8427 DOCREV CUR MEDS BY ELIG CLIN: HCPCS | Performed by: NURSE PRACTITIONER

## 2018-11-15 PROCEDURE — 1101F PT FALLS ASSESS-DOCD LE1/YR: CPT | Performed by: NURSE PRACTITIONER

## 2018-11-15 PROCEDURE — 4040F PNEUMOC VAC/ADMIN/RCVD: CPT | Performed by: NURSE PRACTITIONER

## 2018-11-15 PROCEDURE — 1123F ACP DISCUSS/DSCN MKR DOCD: CPT | Performed by: NURSE PRACTITIONER

## 2018-11-15 PROCEDURE — G8484 FLU IMMUNIZE NO ADMIN: HCPCS | Performed by: NURSE PRACTITIONER

## 2018-11-15 PROCEDURE — G8417 CALC BMI ABV UP PARAM F/U: HCPCS | Performed by: NURSE PRACTITIONER

## 2018-11-15 PROCEDURE — 1090F PRES/ABSN URINE INCON ASSESS: CPT | Performed by: NURSE PRACTITIONER

## 2018-11-15 RX ORDER — METHYLPREDNISOLONE 4 MG/1
TABLET ORAL
Qty: 1 KIT | Refills: 0 | Status: SHIPPED | OUTPATIENT
Start: 2018-11-15 | End: 2018-11-21

## 2018-11-15 ASSESSMENT — ENCOUNTER SYMPTOMS
EYE DISCHARGE: 0
COUGH: 0
STRIDOR: 0
PHOTOPHOBIA: 0
SHORTNESS OF BREATH: 0
VOMITING: 0
EYE ITCHING: 0
NAUSEA: 0
DIARRHEA: 0
COLOR CHANGE: 0
WHEEZING: 0
EYE PAIN: 0
ABDOMINAL PAIN: 0
EYE REDNESS: 0

## 2018-11-26 ENCOUNTER — TELEPHONE (OUTPATIENT)
Dept: FAMILY MEDICINE CLINIC | Age: 68
End: 2018-11-26

## 2018-11-26 NOTE — TELEPHONE ENCOUNTER
Patient called, left message that she saw Dr. Efren Baer and they advised her she will need and EMG and ordered by you? She would like to have it in [place] if possible.

## 2018-11-27 DIAGNOSIS — G56.01 RIGHT CARPAL TUNNEL SYNDROME: Primary | ICD-10-CM

## 2018-11-29 ENCOUNTER — TELEPHONE (OUTPATIENT)
Dept: FAMILY MEDICINE CLINIC | Age: 68
End: 2018-11-29

## 2018-11-29 DIAGNOSIS — M25.531 RIGHT WRIST PAIN: Primary | ICD-10-CM

## 2018-11-29 RX ORDER — TRAMADOL HYDROCHLORIDE 50 MG/1
50 TABLET ORAL EVERY 8 HOURS PRN
Qty: 21 TABLET | Refills: 0 | Status: SHIPPED | OUTPATIENT
Start: 2018-11-29 | End: 2018-12-06

## 2018-12-12 ENCOUNTER — HOSPITAL ENCOUNTER (OUTPATIENT)
Dept: NEUROLOGY | Age: 68
Discharge: HOME | End: 2018-12-12
Payer: MEDICARE

## 2018-12-12 VITALS — HEIGHT: 61 IN | WEIGHT: 170 LBS | BODY MASS INDEX: 32.1 KG/M2

## 2018-12-12 DIAGNOSIS — G56.01 RIGHT CARPAL TUNNEL SYNDROME: ICD-10-CM

## 2018-12-12 PROCEDURE — 95886 MUSC TEST DONE W/N TEST COMP: CPT

## 2018-12-12 PROCEDURE — 95909 NRV CNDJ TST 5-6 STUDIES: CPT

## 2018-12-12 PROCEDURE — 95886 MUSC TEST DONE W/N TEST COMP: CPT | Performed by: PHYSICAL MEDICINE & REHABILITATION

## 2018-12-12 PROCEDURE — 95909 NRV CNDJ TST 5-6 STUDIES: CPT | Performed by: PHYSICAL MEDICINE & REHABILITATION

## 2018-12-13 ENCOUNTER — TELEPHONE (OUTPATIENT)
Dept: FAMILY MEDICINE CLINIC | Age: 68
End: 2018-12-13

## 2018-12-13 DIAGNOSIS — G56.01 RIGHT CARPAL TUNNEL SYNDROME: Primary | ICD-10-CM

## 2018-12-13 RX ORDER — NAPROXEN 500 MG/1
500 TABLET ORAL 2 TIMES DAILY WITH MEALS
Qty: 60 TABLET | Refills: 3 | Status: SHIPPED | OUTPATIENT
Start: 2018-12-13 | End: 2019-02-13

## 2018-12-13 NOTE — TELEPHONE ENCOUNTER
EMG done yesterday, confirmed she has carpel tunnel and cannot get into see Dr. Ernestine Retana until 2-14-19. She would like to know if there is an anti-inflammatory she can take instead of a pain medication? Also, is there another ortho doctor she can possibly be referred to that can see her sooner?

## 2019-01-09 ENCOUNTER — OFFICE VISIT (OUTPATIENT)
Dept: FAMILY MEDICINE CLINIC | Age: 69
End: 2019-01-09
Payer: MEDICARE

## 2019-01-09 ENCOUNTER — HOSPITAL ENCOUNTER (OUTPATIENT)
Age: 69
Discharge: HOME | End: 2019-01-11
Payer: MEDICARE

## 2019-01-09 VITALS
DIASTOLIC BLOOD PRESSURE: 78 MMHG | SYSTOLIC BLOOD PRESSURE: 134 MMHG | BODY MASS INDEX: 33.23 KG/M2 | HEART RATE: 83 BPM | HEIGHT: 61 IN | TEMPERATURE: 98.2 F | WEIGHT: 176 LBS | OXYGEN SATURATION: 96 %

## 2019-01-09 DIAGNOSIS — E78.2 MIXED HYPERLIPIDEMIA: ICD-10-CM

## 2019-01-09 DIAGNOSIS — I10 ESSENTIAL HYPERTENSION: Primary | ICD-10-CM

## 2019-01-09 DIAGNOSIS — I10 ESSENTIAL HYPERTENSION: ICD-10-CM

## 2019-01-09 PROBLEM — K43.2 INCISIONAL HERNIA, WITHOUT OBSTRUCTION OR GANGRENE: Status: RESOLVED | Noted: 2018-01-03 | Resolved: 2019-01-09

## 2019-01-09 PROBLEM — G89.29 CHRONIC RIGHT UPPER QUADRANT PAIN: Status: RESOLVED | Noted: 2018-02-05 | Resolved: 2019-01-09

## 2019-01-09 PROBLEM — R10.11 CHRONIC RIGHT UPPER QUADRANT PAIN: Status: RESOLVED | Noted: 2018-02-05 | Resolved: 2019-01-09

## 2019-01-09 LAB
ALBUMIN SERPL-MCNC: 4.5 G/DL (ref 3.5–5.2)
ALP BLD-CCNC: 70 U/L (ref 35–104)
ALT SERPL-CCNC: 14 U/L (ref 0–32)
ANION GAP SERPL CALCULATED.3IONS-SCNC: 22 MMOL/L (ref 7–16)
AST SERPL-CCNC: 23 U/L (ref 0–31)
BASOPHILS ABSOLUTE: 0.06 E9/L (ref 0–0.2)
BASOPHILS RELATIVE PERCENT: 0.9 % (ref 0–2)
BILIRUB SERPL-MCNC: 0.7 MG/DL (ref 0–1.2)
BUN BLDV-MCNC: 17 MG/DL (ref 8–23)
CALCIUM SERPL-MCNC: 9.3 MG/DL (ref 8.6–10.2)
CHLORIDE BLD-SCNC: 103 MMOL/L (ref 98–107)
CHOLESTEROL, TOTAL: 165 MG/DL (ref 0–199)
CO2: 19 MMOL/L (ref 22–29)
CREAT SERPL-MCNC: 1.1 MG/DL (ref 0.5–1)
EOSINOPHILS ABSOLUTE: 0.57 E9/L (ref 0.05–0.5)
EOSINOPHILS RELATIVE PERCENT: 8.9 % (ref 0–6)
GFR AFRICAN AMERICAN: >60
GFR NON-AFRICAN AMERICAN: 50 ML/MIN/1.73
GLUCOSE BLD-MCNC: 101 MG/DL (ref 74–99)
HCT VFR BLD CALC: 43.3 % (ref 34–48)
HDLC SERPL-MCNC: 42 MG/DL
HEMOGLOBIN: 14 G/DL (ref 11.5–15.5)
IMMATURE GRANULOCYTES #: 0.02 E9/L
IMMATURE GRANULOCYTES %: 0.3 % (ref 0–5)
LDL CHOLESTEROL CALCULATED: 95 MG/DL (ref 0–99)
LYMPHOCYTES ABSOLUTE: 1.92 E9/L (ref 1.5–4)
LYMPHOCYTES RELATIVE PERCENT: 30.1 % (ref 20–42)
MCH RBC QN AUTO: 30.6 PG (ref 26–35)
MCHC RBC AUTO-ENTMCNC: 32.3 % (ref 32–34.5)
MCV RBC AUTO: 94.5 FL (ref 80–99.9)
MONOCYTES ABSOLUTE: 0.56 E9/L (ref 0.1–0.95)
MONOCYTES RELATIVE PERCENT: 8.8 % (ref 2–12)
NEUTROPHILS ABSOLUTE: 3.25 E9/L (ref 1.8–7.3)
NEUTROPHILS RELATIVE PERCENT: 51 % (ref 43–80)
PDW BLD-RTO: 13.5 FL (ref 11.5–15)
PLATELET # BLD: 303 E9/L (ref 130–450)
PMV BLD AUTO: 10.1 FL (ref 7–12)
POTASSIUM SERPL-SCNC: 4.7 MMOL/L (ref 3.5–5)
RBC # BLD: 4.58 E12/L (ref 3.5–5.5)
SODIUM BLD-SCNC: 144 MMOL/L (ref 132–146)
TOTAL PROTEIN: 7.9 G/DL (ref 6.4–8.3)
TRIGL SERPL-MCNC: 141 MG/DL (ref 0–149)
TSH SERPL DL<=0.05 MIU/L-ACNC: 1.83 UIU/ML (ref 0.27–4.2)
VLDLC SERPL CALC-MCNC: 28 MG/DL
WBC # BLD: 6.4 E9/L (ref 4.5–11.5)

## 2019-01-09 PROCEDURE — 1123F ACP DISCUSS/DSCN MKR DOCD: CPT | Performed by: NURSE PRACTITIONER

## 2019-01-09 PROCEDURE — 1036F TOBACCO NON-USER: CPT | Performed by: NURSE PRACTITIONER

## 2019-01-09 PROCEDURE — 1101F PT FALLS ASSESS-DOCD LE1/YR: CPT | Performed by: NURSE PRACTITIONER

## 2019-01-09 PROCEDURE — 84443 ASSAY THYROID STIM HORMONE: CPT

## 2019-01-09 PROCEDURE — G8417 CALC BMI ABV UP PARAM F/U: HCPCS | Performed by: NURSE PRACTITIONER

## 2019-01-09 PROCEDURE — 3017F COLORECTAL CA SCREEN DOC REV: CPT | Performed by: NURSE PRACTITIONER

## 2019-01-09 PROCEDURE — G8484 FLU IMMUNIZE NO ADMIN: HCPCS | Performed by: NURSE PRACTITIONER

## 2019-01-09 PROCEDURE — G8400 PT W/DXA NO RESULTS DOC: HCPCS | Performed by: NURSE PRACTITIONER

## 2019-01-09 PROCEDURE — 4040F PNEUMOC VAC/ADMIN/RCVD: CPT | Performed by: NURSE PRACTITIONER

## 2019-01-09 PROCEDURE — 85025 COMPLETE CBC W/AUTO DIFF WBC: CPT

## 2019-01-09 PROCEDURE — 80053 COMPREHEN METABOLIC PANEL: CPT

## 2019-01-09 PROCEDURE — G8427 DOCREV CUR MEDS BY ELIG CLIN: HCPCS | Performed by: NURSE PRACTITIONER

## 2019-01-09 PROCEDURE — 99214 OFFICE O/P EST MOD 30 MIN: CPT | Performed by: NURSE PRACTITIONER

## 2019-01-09 PROCEDURE — 1090F PRES/ABSN URINE INCON ASSESS: CPT | Performed by: NURSE PRACTITIONER

## 2019-01-09 PROCEDURE — 80061 LIPID PANEL: CPT

## 2019-01-09 ASSESSMENT — ENCOUNTER SYMPTOMS
CHEST TIGHTNESS: 0
DIARRHEA: 0
COUGH: 0
SHORTNESS OF BREATH: 0
STRIDOR: 0
RECTAL PAIN: 0
EYE DISCHARGE: 0
EYE ITCHING: 0
ANAL BLEEDING: 0
BLOOD IN STOOL: 0
APNEA: 0
PHOTOPHOBIA: 0
ABDOMINAL DISTENTION: 0
ABDOMINAL PAIN: 0
EYE REDNESS: 0
EYE PAIN: 0
COLOR CHANGE: 0
VOICE CHANGE: 0
WHEEZING: 0
VOMITING: 0
TROUBLE SWALLOWING: 0
NAUSEA: 0
CONSTIPATION: 0
CHOKING: 0

## 2019-01-09 ASSESSMENT — PATIENT HEALTH QUESTIONNAIRE - PHQ9
SUM OF ALL RESPONSES TO PHQ QUESTIONS 1-9: 0
SUM OF ALL RESPONSES TO PHQ QUESTIONS 1-9: 0
1. LITTLE INTEREST OR PLEASURE IN DOING THINGS: 0
2. FEELING DOWN, DEPRESSED OR HOPELESS: 0
SUM OF ALL RESPONSES TO PHQ9 QUESTIONS 1 & 2: 0

## 2019-01-24 DIAGNOSIS — I10 ESSENTIAL HYPERTENSION: ICD-10-CM

## 2019-01-24 RX ORDER — AMLODIPINE BESYLATE 5 MG/1
TABLET ORAL
Qty: 30 TABLET | Refills: 5 | Status: SHIPPED | OUTPATIENT
Start: 2019-01-24 | End: 2019-06-17 | Stop reason: SDUPTHER

## 2019-02-08 RX ORDER — PAROXETINE HYDROCHLORIDE 20 MG/1
20 TABLET, FILM COATED ORAL EVERY MORNING
Qty: 90 TABLET | Refills: 1 | Status: SHIPPED | OUTPATIENT
Start: 2019-02-08 | End: 2019-07-01 | Stop reason: ALTCHOICE

## 2019-02-13 ENCOUNTER — OFFICE VISIT (OUTPATIENT)
Dept: FAMILY MEDICINE CLINIC | Age: 69
End: 2019-02-13
Payer: MEDICARE

## 2019-02-13 ENCOUNTER — HOSPITAL ENCOUNTER (OUTPATIENT)
Age: 69
Discharge: HOME | End: 2019-02-15
Payer: MEDICARE

## 2019-02-13 VITALS
WEIGHT: 173 LBS | TEMPERATURE: 98.2 F | HEIGHT: 61 IN | DIASTOLIC BLOOD PRESSURE: 74 MMHG | HEART RATE: 76 BPM | OXYGEN SATURATION: 96 % | RESPIRATION RATE: 16 BRPM | SYSTOLIC BLOOD PRESSURE: 124 MMHG | BODY MASS INDEX: 32.66 KG/M2

## 2019-02-13 DIAGNOSIS — N28.9 ABNORMAL KIDNEY FUNCTION: ICD-10-CM

## 2019-02-13 DIAGNOSIS — N28.9 ABNORMAL KIDNEY FUNCTION: Primary | ICD-10-CM

## 2019-02-13 LAB
ALBUMIN SERPL-MCNC: 4.3 G/DL (ref 3.5–5.2)
ALP BLD-CCNC: 77 U/L (ref 35–104)
ALT SERPL-CCNC: 18 U/L (ref 0–32)
ANION GAP SERPL CALCULATED.3IONS-SCNC: 7 MMOL/L (ref 7–16)
AST SERPL-CCNC: 24 U/L (ref 0–31)
BILIRUB SERPL-MCNC: 0.6 MG/DL (ref 0–1.2)
BUN BLDV-MCNC: 12 MG/DL (ref 8–23)
CALCIUM SERPL-MCNC: 9.5 MG/DL (ref 8.6–10.2)
CHLORIDE BLD-SCNC: 102 MMOL/L (ref 98–107)
CO2: 27 MMOL/L (ref 22–29)
CREAT SERPL-MCNC: 1 MG/DL (ref 0.5–1)
GFR AFRICAN AMERICAN: >60
GFR NON-AFRICAN AMERICAN: 55 ML/MIN/1.73
GLUCOSE BLD-MCNC: 81 MG/DL (ref 74–99)
POTASSIUM SERPL-SCNC: 4.3 MMOL/L (ref 3.5–5)
SODIUM BLD-SCNC: 136 MMOL/L (ref 132–146)
TOTAL PROTEIN: 7.7 G/DL (ref 6.4–8.3)

## 2019-02-13 PROCEDURE — G8484 FLU IMMUNIZE NO ADMIN: HCPCS | Performed by: NURSE PRACTITIONER

## 2019-02-13 PROCEDURE — 3017F COLORECTAL CA SCREEN DOC REV: CPT | Performed by: NURSE PRACTITIONER

## 2019-02-13 PROCEDURE — 80053 COMPREHEN METABOLIC PANEL: CPT

## 2019-02-13 PROCEDURE — 1123F ACP DISCUSS/DSCN MKR DOCD: CPT | Performed by: NURSE PRACTITIONER

## 2019-02-13 PROCEDURE — 1101F PT FALLS ASSESS-DOCD LE1/YR: CPT | Performed by: NURSE PRACTITIONER

## 2019-02-13 PROCEDURE — G8400 PT W/DXA NO RESULTS DOC: HCPCS | Performed by: NURSE PRACTITIONER

## 2019-02-13 PROCEDURE — G8417 CALC BMI ABV UP PARAM F/U: HCPCS | Performed by: NURSE PRACTITIONER

## 2019-02-13 PROCEDURE — 4040F PNEUMOC VAC/ADMIN/RCVD: CPT | Performed by: NURSE PRACTITIONER

## 2019-02-13 PROCEDURE — G8427 DOCREV CUR MEDS BY ELIG CLIN: HCPCS | Performed by: NURSE PRACTITIONER

## 2019-02-13 PROCEDURE — 99213 OFFICE O/P EST LOW 20 MIN: CPT | Performed by: NURSE PRACTITIONER

## 2019-02-13 PROCEDURE — 1090F PRES/ABSN URINE INCON ASSESS: CPT | Performed by: NURSE PRACTITIONER

## 2019-02-13 PROCEDURE — 1036F TOBACCO NON-USER: CPT | Performed by: NURSE PRACTITIONER

## 2019-02-13 RX ORDER — SIMVASTATIN 40 MG
40 TABLET ORAL NIGHTLY
Qty: 90 TABLET | Refills: 1 | Status: ON HOLD
Start: 2019-02-13 | End: 2022-06-17

## 2019-02-13 ASSESSMENT — ENCOUNTER SYMPTOMS
DIARRHEA: 0
COUGH: 0
EYE REDNESS: 0
VOMITING: 0
PHOTOPHOBIA: 0
STRIDOR: 0
COLOR CHANGE: 0
EYE DISCHARGE: 0
NAUSEA: 0
EYE PAIN: 0
EYE ITCHING: 0
SHORTNESS OF BREATH: 0
WHEEZING: 0
ABDOMINAL PAIN: 0

## 2019-03-05 ENCOUNTER — TELEPHONE (OUTPATIENT)
Dept: FAMILY MEDICINE CLINIC | Age: 69
End: 2019-03-05

## 2019-03-18 ENCOUNTER — TELEPHONE (OUTPATIENT)
Dept: FAMILY MEDICINE CLINIC | Age: 69
End: 2019-03-18

## 2019-03-20 ENCOUNTER — HOSPITAL ENCOUNTER (OUTPATIENT)
Dept: GENERAL RADIOLOGY | Age: 69
Discharge: HOME | End: 2019-03-22
Payer: MEDICARE

## 2019-03-20 ENCOUNTER — OFFICE VISIT (OUTPATIENT)
Dept: FAMILY MEDICINE CLINIC | Age: 69
End: 2019-03-20
Payer: MEDICARE

## 2019-03-20 ENCOUNTER — HOSPITAL ENCOUNTER (OUTPATIENT)
Age: 69
Discharge: HOME | End: 2019-03-22
Payer: MEDICARE

## 2019-03-20 VITALS
BODY MASS INDEX: 33.42 KG/M2 | TEMPERATURE: 98.1 F | WEIGHT: 177 LBS | HEART RATE: 77 BPM | SYSTOLIC BLOOD PRESSURE: 136 MMHG | OXYGEN SATURATION: 98 % | DIASTOLIC BLOOD PRESSURE: 88 MMHG | RESPIRATION RATE: 16 BRPM | HEIGHT: 61 IN

## 2019-03-20 DIAGNOSIS — M54.2 NECK PAIN, BILATERAL POSTERIOR: Primary | ICD-10-CM

## 2019-03-20 DIAGNOSIS — S16.1XXA STRAIN OF NECK MUSCLE, INITIAL ENCOUNTER: ICD-10-CM

## 2019-03-20 DIAGNOSIS — M54.2 NECK PAIN, BILATERAL POSTERIOR: ICD-10-CM

## 2019-03-20 PROCEDURE — G8400 PT W/DXA NO RESULTS DOC: HCPCS | Performed by: NURSE PRACTITIONER

## 2019-03-20 PROCEDURE — 3017F COLORECTAL CA SCREEN DOC REV: CPT | Performed by: NURSE PRACTITIONER

## 2019-03-20 PROCEDURE — 4040F PNEUMOC VAC/ADMIN/RCVD: CPT | Performed by: NURSE PRACTITIONER

## 2019-03-20 PROCEDURE — 72040 X-RAY EXAM NECK SPINE 2-3 VW: CPT

## 2019-03-20 PROCEDURE — G8484 FLU IMMUNIZE NO ADMIN: HCPCS | Performed by: NURSE PRACTITIONER

## 2019-03-20 PROCEDURE — 99213 OFFICE O/P EST LOW 20 MIN: CPT | Performed by: NURSE PRACTITIONER

## 2019-03-20 PROCEDURE — 1090F PRES/ABSN URINE INCON ASSESS: CPT | Performed by: NURSE PRACTITIONER

## 2019-03-20 PROCEDURE — G8417 CALC BMI ABV UP PARAM F/U: HCPCS | Performed by: NURSE PRACTITIONER

## 2019-03-20 PROCEDURE — 1036F TOBACCO NON-USER: CPT | Performed by: NURSE PRACTITIONER

## 2019-03-20 PROCEDURE — G8427 DOCREV CUR MEDS BY ELIG CLIN: HCPCS | Performed by: NURSE PRACTITIONER

## 2019-03-20 PROCEDURE — 1123F ACP DISCUSS/DSCN MKR DOCD: CPT | Performed by: NURSE PRACTITIONER

## 2019-03-20 PROCEDURE — 96372 THER/PROPH/DIAG INJ SC/IM: CPT | Performed by: NURSE PRACTITIONER

## 2019-03-20 PROCEDURE — 1101F PT FALLS ASSESS-DOCD LE1/YR: CPT | Performed by: NURSE PRACTITIONER

## 2019-03-20 RX ORDER — HYDROCODONE BITARTRATE AND ACETAMINOPHEN 5; 325 MG/1; MG/1
1 TABLET ORAL EVERY 8 HOURS PRN
Qty: 21 TABLET | Refills: 0 | Status: SHIPPED | OUTPATIENT
Start: 2019-03-20 | End: 2019-03-27

## 2019-03-20 RX ORDER — TRIAMCINOLONE ACETONIDE 40 MG/ML
40 INJECTION, SUSPENSION INTRA-ARTICULAR; INTRAMUSCULAR ONCE
Status: COMPLETED | OUTPATIENT
Start: 2019-03-20 | End: 2019-03-20

## 2019-03-20 RX ADMIN — TRIAMCINOLONE ACETONIDE 40 MG: 40 INJECTION, SUSPENSION INTRA-ARTICULAR; INTRAMUSCULAR at 14:29

## 2019-03-22 ENCOUNTER — TELEPHONE (OUTPATIENT)
Dept: FAMILY MEDICINE CLINIC | Age: 69
End: 2019-03-22

## 2019-03-22 NOTE — TELEPHONE ENCOUNTER
Advised patient results of x-ray shows   diffuse arthritis/degeneration to cervical spine, if no improvement with meds prescribed yesterday recommend following at internal spine clinic. Patient will wait a week and call back if she would like referral placed.

## 2019-03-24 ASSESSMENT — ENCOUNTER SYMPTOMS
COUGH: 0
SHORTNESS OF BREATH: 0
DIARRHEA: 0
NAUSEA: 0
STRIDOR: 0
WHEEZING: 0
CHOKING: 0
PHOTOPHOBIA: 0
ABDOMINAL PAIN: 0
EYE DISCHARGE: 0
COLOR CHANGE: 0
VOMITING: 0
EYE PAIN: 0
EYE ITCHING: 0
EYE REDNESS: 0

## 2019-03-25 ENCOUNTER — TELEPHONE (OUTPATIENT)
Dept: FAMILY MEDICINE CLINIC | Age: 69
End: 2019-03-25

## 2019-03-25 DIAGNOSIS — M50.30 DEGENERATIVE CERVICAL DISC: Primary | ICD-10-CM

## 2019-04-02 ENCOUNTER — INITIAL CONSULT (OUTPATIENT)
Dept: PHYSICAL MEDICINE AND REHAB | Age: 69
End: 2019-04-02
Payer: MEDICARE

## 2019-04-02 VITALS
HEART RATE: 83 BPM | BODY MASS INDEX: 33.23 KG/M2 | HEIGHT: 61 IN | SYSTOLIC BLOOD PRESSURE: 124 MMHG | WEIGHT: 176 LBS | DIASTOLIC BLOOD PRESSURE: 86 MMHG

## 2019-04-02 DIAGNOSIS — M79.18 MYOFASCIAL PAIN: ICD-10-CM

## 2019-04-02 DIAGNOSIS — S16.1XXS STRAIN OF NECK MUSCLE, SEQUELA: ICD-10-CM

## 2019-04-02 DIAGNOSIS — M54.2 NECK PAIN: Primary | ICD-10-CM

## 2019-04-02 PROCEDURE — 1090F PRES/ABSN URINE INCON ASSESS: CPT | Performed by: PHYSICAL MEDICINE & REHABILITATION

## 2019-04-02 PROCEDURE — 3017F COLORECTAL CA SCREEN DOC REV: CPT | Performed by: PHYSICAL MEDICINE & REHABILITATION

## 2019-04-02 PROCEDURE — 99204 OFFICE O/P NEW MOD 45 MIN: CPT | Performed by: PHYSICAL MEDICINE & REHABILITATION

## 2019-04-02 PROCEDURE — 1036F TOBACCO NON-USER: CPT | Performed by: PHYSICAL MEDICINE & REHABILITATION

## 2019-04-02 PROCEDURE — 4040F PNEUMOC VAC/ADMIN/RCVD: CPT | Performed by: PHYSICAL MEDICINE & REHABILITATION

## 2019-04-02 PROCEDURE — G8400 PT W/DXA NO RESULTS DOC: HCPCS | Performed by: PHYSICAL MEDICINE & REHABILITATION

## 2019-04-02 PROCEDURE — G8417 CALC BMI ABV UP PARAM F/U: HCPCS | Performed by: PHYSICAL MEDICINE & REHABILITATION

## 2019-04-02 PROCEDURE — G8427 DOCREV CUR MEDS BY ELIG CLIN: HCPCS | Performed by: PHYSICAL MEDICINE & REHABILITATION

## 2019-04-02 PROCEDURE — 1123F ACP DISCUSS/DSCN MKR DOCD: CPT | Performed by: PHYSICAL MEDICINE & REHABILITATION

## 2019-04-02 RX ORDER — TIZANIDINE HYDROCHLORIDE 2 MG/1
2 CAPSULE, GELATIN COATED ORAL 3 TIMES DAILY PRN
Qty: 90 CAPSULE | Refills: 1 | Status: SHIPPED | OUTPATIENT
Start: 2019-04-02 | End: 2019-05-14

## 2019-04-02 NOTE — PROGRESS NOTES
Brandon Augustine, [address] [facility] Physical Medicine and Rehabilitation  [address] [facility] [address]  Phone: [phone]  Fax: [phone]    PCP: KAREN Nick CNP  Date of visit: 4/2/19    Chief Complaint   Patient presents with    Neck Pain     New Patient       Dear Ericamikayla Limon,     Thank you for referring your patient to be seen. As you know,  Odessa Izaguirre is a 79 y.o. female with past medical history as below who presents with right neck pain for 10 weeks. There was a sudden onset of pain after pulling. Now, the pain is constant and occurs daily. The pain is rated Pain Score:   7, is described as \"demons pulling\", and is located in the right neck without radiation to the arm. The symptoms have been unchanged since onset. The pain is better with heat. The pain is worse with turning head, lifting. There is associated numbness/tingling in right hand-- just had CT release. There is no weakness. There is no bowel/bladder changes.      The prior workup has included: Xray    The prior treatment has included:  PT: none   Chiropractic: none    Modalities: heat with no relief   OTC Tylenol: none   NSAIDS: contraindicated-- kidney function     Opioids: norco, tramadol   Membrane stabilizers: none    Muscle relaxers: none   Previous injections: none    Previous surgery at this site: none      Allergies   Allergen Reactions    Tetracyclines & Related Hives    Z-Anant [Azithromycin] Palpitations    Bee Venom        Current Outpatient Medications   Medication Sig Dispense Refill    tiZANidine (ZANAFLEX) 2 MG capsule Take 1 capsule by mouth 3 times daily as needed for Muscle spasms 90 capsule 1    simvastatin (ZOCOR) 40 MG tablet Take 1 tablet by mouth nightly 90 tablet 1    PARoxetine (PAXIL) 20 MG tablet Take 1 tablet by mouth every morning take 1 tablet by mouth once daily 90 tablet 1    amLODIPine (NORVASC) 5 MG tablet 1 tab po daily (Patient taking differently: 0.5 tab po daily) 30 tablet 5    Handicap Placard MISC by Does not apply route Patient cannot walk 200 ft without stopping to rest.    Expiration *1/10/24 1 each 0    pantoprazole (PROTONIX) 20 MG tablet TAKE ONE TABLET BY MOUTH EVERY MORNING 30 MINUTES BEFORE BREAKFAST 30 tablet 5    diphenhydrAMINE (BENADRYL) 25 MG capsule Take 25 mg by mouth every 6 hours as needed for Itching      sodium chloride (OCEAN, BABY AYR) 0.65 % nasal spray 1 spray by Nasal route as needed for Congestion       No current facility-administered medications for this visit. Past Medical History:   Diagnosis Date    Allergic rhinitis     Anxiety     Dyslexia     GERD (gastroesophageal reflux disease)     Hyperlipidemia     Hypertension     Multiple sclerosis ([facility] [address] 1980    Osteoporosis     Sleep disorder        Past Surgical History:   Procedure Laterality Date    COLECTOMY      diverticulitis     ID EGD TRANSORAL BIOPSY SINGLE/MULTIPLE N/A 2018    EGD BIOPSY performed by [facility]i Knapp [facility] at [facility]      WISDOM TOOTH EXTRACTION         Family History   Problem Relation Age of Onset    Cancer Father         esophageal    Diabetes Mother     Alzheimer's Disease Mother        Social History     Tobacco Use    Smoking status: Former Smoker     Packs/day: 1.00     Years: 43.00     Pack years: 43.00     Types: Cigarettes     Start date: 1971     Last attempt to quit: 2018     Years since quittin.2    Smokeless tobacco: Never Used   Substance Use Topics    Alcohol use: Yes     Alcohol/week: 0.0 oz     Comment: twice a year    Drug use: No        Functional Status: The patient is able to ambulate and perform activities of daily living without the use of an assistive device. ROS: For more complete ROS answered by the patient, please see .     Constitutional: Denies fevers, chills, night sweats, unintentional weight loss     Skin: Denies rash or skin changes     Eyes: Denies vision changes    Ears/Nose/Throat: Denies nasal congestion or sore throat     Respiratory: Denies SOB or cough     Cardiovascular: Denies CP, palpitations, edema      Gastrointestinal: Denies abdominal pain,  N/V, constipation, or diarrhea    Genitourinary: Denies urinary symptoms    Neurologic: See HPI.     MSK: See HPI. Psychiatric: Denies sleep disturbance, anxiety, depression    Hematologic/Lymphatic/Immunologic: Denies bruising       Physical Exam:   Blood pressure 124/86, pulse 83, height 5' 1\" (1.549 m), weight 176 lb (79.8 kg), not currently breastfeeding. General: well developed and well nourished in no acute distress. Body habitus is obese  HEENT: No rhinorrhea, sneezing, yawning, or lacrimation. No scleral icterus or conjunctival injection. Resp: symmetrical chest expansion, unlabored breathing, respirations unlabored. CV: Heart rate is regular. Peripheral pulses are palpable  Lymph: No visible regional lymphadenopathy. Skin: No rashes or ecchymosis. Normal turgor. Psych: Mood is calm. Affect is normal.   Ext: No edema noted     MSK:   Cervical Exam:   Inspection: Shoulder girdles were asymmetric. The cervical lordotic curvature was decreased. Palpatory exam: revealed tenderness along right upper and middle cervical paraspinals , no tenderness along upper, middle and lower spinous processes, tenderness of right upper trapezius muscle. There was spasm of the right cervical psp. There were trigger points. ROM: ROM decreased  Special/Provocative tests:   Spurling's maneuver was negative. Neurological Exam:  Strength:   R  L  Deltoid   5  5  Biceps   5  5  Triceps  5  5  Wrist Ext  5  5  Finger Abd  5  5  Hip Flex  5  5  Knee Ext  5  5  Ankle dorsi  5  5  EHL   5 5  Ankle Plantar  5  5    Sensory:  Intact for light touch in all upper and lower extremity dermatomes.       Reflexes:   R L  Biceps   (2+) (2+)  Triceps  (2+) (2+)  BR   (2+) (2+)  Patellar  (2+) (2+)  Ankle Jerk  (2+) (2+)    Harmon is negative bilaterally. Imaging: (personally reviewed by me 04/02/19)  Xray C spine   Multiple radiographs of cervical spine are obtained.       These images reveal a few millimeters anterior spondylolisthesis of C3   in relation to C4 and C4 in relation to C5. There is no definite   fracture or other spondylolisthesis. There is diffuse loss of disc   height, degenerative spondylosis, uncovertebral hypertrophy and facet   arthropathy, most notably at the lower cervical levels. There is   cervical kyphosis apex at C5-6. Diffuse bone demineralization is   present. Otherwise the regional soft tissue and osseous structures are   unremarkable.           Impression       Grade 1 anterolisthesis of C3 and C4 appears to be on the basis of   chronic degenerative disc and facet disease.       Cervical kyphosis which can be due to muscle spasm or positional.       Diffuse degenerative disc and facet disease most notably at the lower   cervical levels. Impression:   Logan Barroso is a 79 y.o. female    1. Neck pain    2. Myofascial pain    3. Strain of neck muscle, sequela        Plan:   · Refer to PT   · Start zanaflex 2mg TID PRN   · Tylenol PRN      The patient was educated about the diagnosis, prognosis, indications, risks and benefits of treatment. An opportunity to ask questions was given to the patient and questions were answered. The patient agreed to proceed with the recommended treatment as described above.  Follow up in 6 weeks      Thank you for the consultation and for allowing me to participate in the care of this patient.         Sincerely,     JermainePlDO katelynn, [facility]   Board Certified Physical Medicine and Rehabilitation

## 2019-05-14 ENCOUNTER — OFFICE VISIT (OUTPATIENT)
Dept: PHYSICAL MEDICINE AND REHAB | Age: 69
End: 2019-05-14
Payer: MEDICARE

## 2019-05-14 VITALS
SYSTOLIC BLOOD PRESSURE: 124 MMHG | HEIGHT: 61 IN | BODY MASS INDEX: 33.99 KG/M2 | HEART RATE: 85 BPM | DIASTOLIC BLOOD PRESSURE: 84 MMHG | WEIGHT: 180 LBS

## 2019-05-14 DIAGNOSIS — M79.601 RIGHT ARM PAIN: ICD-10-CM

## 2019-05-14 DIAGNOSIS — M54.2 NECK PAIN: Primary | ICD-10-CM

## 2019-05-14 DIAGNOSIS — F40.240 CLAUSTROPHOBIA: ICD-10-CM

## 2019-05-14 PROCEDURE — 3017F COLORECTAL CA SCREEN DOC REV: CPT | Performed by: PHYSICAL MEDICINE & REHABILITATION

## 2019-05-14 PROCEDURE — 99214 OFFICE O/P EST MOD 30 MIN: CPT | Performed by: PHYSICAL MEDICINE & REHABILITATION

## 2019-05-14 PROCEDURE — 1123F ACP DISCUSS/DSCN MKR DOCD: CPT | Performed by: PHYSICAL MEDICINE & REHABILITATION

## 2019-05-14 PROCEDURE — G8417 CALC BMI ABV UP PARAM F/U: HCPCS | Performed by: PHYSICAL MEDICINE & REHABILITATION

## 2019-05-14 PROCEDURE — 1090F PRES/ABSN URINE INCON ASSESS: CPT | Performed by: PHYSICAL MEDICINE & REHABILITATION

## 2019-05-14 PROCEDURE — 4040F PNEUMOC VAC/ADMIN/RCVD: CPT | Performed by: PHYSICAL MEDICINE & REHABILITATION

## 2019-05-14 PROCEDURE — 1036F TOBACCO NON-USER: CPT | Performed by: PHYSICAL MEDICINE & REHABILITATION

## 2019-05-14 PROCEDURE — G8400 PT W/DXA NO RESULTS DOC: HCPCS | Performed by: PHYSICAL MEDICINE & REHABILITATION

## 2019-05-14 PROCEDURE — G8427 DOCREV CUR MEDS BY ELIG CLIN: HCPCS | Performed by: PHYSICAL MEDICINE & REHABILITATION

## 2019-05-14 RX ORDER — LORAZEPAM 1 MG/1
2 TABLET ORAL ONCE
Qty: 2 TABLET | Refills: 0 | Status: SHIPPED | OUTPATIENT
Start: 2019-05-14 | End: 2019-05-14

## 2019-05-14 RX ORDER — TIZANIDINE 2 MG/1
2 TABLET ORAL 3 TIMES DAILY PRN
COMMUNITY
Start: 2019-04-02 | End: 2019-07-01 | Stop reason: SINTOL

## 2019-05-14 NOTE — PROGRESS NOTES
Caleb White, [address] [facility] Physical Medicine and Rehabilitation  [address] [facility] [address]  Phone: [phone]  Fax: [phone]    PCP: KAREN Escobar CNP  Date of visit: 5/14/19    Chief Complaint   Patient presents with    Neck Pain     follow up       Interval:   Patient presents today for follow up visit regarding neck pain. Since last seen, she reports some improvement, but still complains of pain. She has noticed better ROM. The pain is rated Pain Score:   4, is described as pulling, and is located in the right neck now radiating into right arm. The pain is better with heat. The pain is worse with turning head, lifting. There is associated numbness/tingling in right hand-- just had CT release. There is no weakness. There is no bowel/bladder changes. The prior workup has included: Xray    The prior treatment has included:  PT: currently with some relief   Chiropractic: none    Modalities: heat with no relief   OTC Tylenol: yes  NSAIDS: contraindicated-- kidney function     Opioids: norco, tramadol   Membrane stabilizers: none    Muscle relaxers: zanaflex   Previous injections: none    Previous surgery at this site: none      Allergies   Allergen Reactions    Tetracyclines & Related Hives    Z-Anant [Azithromycin] Palpitations    Bee Venom        Current Outpatient Medications   Medication Sig Dispense Refill    tiZANidine (ZANAFLEX) 2 MG tablet Take 2 mg by mouth 3 times daily as needed for Muscle spasms      LORazepam (ATIVAN) 1 MG tablet Take 2 tablets by mouth once for 1 dose.  Take 30 minutes prior to scheduled test 2 tablet 0    simvastatin (ZOCOR) 40 MG tablet Take 1 tablet by mouth nightly 90 tablet 1    PARoxetine (PAXIL) 20 MG tablet Take 1 tablet by mouth every morning take 1 tablet by mouth once daily 90 tablet 1    amLODIPine (NORVASC) 5 MG tablet 1 tab po daily (Patient taking differently: 0.5 tab po daily) 30 tablet 5    Handicap Placard MISC by Does not apply route Patient cannot walk 200 ft without stopping to rest.    Expiration *1/10/24 1 each 0    pantoprazole (PROTONIX) 20 MG tablet TAKE ONE TABLET BY MOUTH EVERY MORNING 30 MINUTES BEFORE BREAKFAST 30 tablet 5    diphenhydrAMINE (BENADRYL) 25 MG capsule Take 25 mg by mouth every 6 hours as needed for Itching      sodium chloride (OCEAN, BABY AYR) 0.65 % nasal spray 1 spray by Nasal route as needed for Congestion       No current facility-administered medications for this visit. Past Medical History:   Diagnosis Date    Allergic rhinitis     Anxiety     Dyslexia     GERD (gastroesophageal reflux disease)     Hyperlipidemia     Hypertension     Multiple sclerosis ([facility] [address] 1980    Osteoporosis     Sleep disorder        Past Surgical History:   Procedure Laterality Date    COLECTOMY      diverticulitis     [place] EGD TRANSORAL BIOPSY SINGLE/MULTIPLE N/A 2018    EGD BIOPSY performed by Med [facility]j [facility] at [facility] [address]      WISDOM TOOTH EXTRACTION         Family History   Problem Relation Age of Onset    Cancer Father         esophageal    Diabetes Mother     Alzheimer's Disease Mother        Social History     Tobacco Use    Smoking status: Former Smoker     Packs/day: 1.00     Years: 43.00     Pack years: 43.00     Types: Cigarettes     Start date: 1971     Last attempt to quit: 2018     Years since quittin.3    Smokeless tobacco: Never Used   Substance Use Topics    Alcohol use: Yes     Alcohol/week: 0.0 oz     Comment: twice a year    Drug use: No        Functional Status: The patient is able to ambulate and perform activities of daily living without the use of an assistive device.            ROS:    Constitutional: Denies fevers, chills, night sweats, unintentional weight loss     Skin: Denies rash or skin changes     Eyes: Denies vision changes    Ears/Nose/Throat: Denies nasal congestion or sore throat     Respiratory: Denies SOB or cough     Cardiovascular: Denies CP, palpitations, edema      Gastrointestinal: Denies abdominal pain,  N/V, constipation, or diarrhea    Genitourinary: Denies urinary symptoms    Neurologic: See HPI.     MSK: See HPI. Psychiatric: Denies sleep disturbance, anxiety, depression    Hematologic/Lymphatic/Immunologic: Denies bruising       Physical Exam:   Blood pressure 124/84, pulse 85, height 5' 1\" (1.549 m), weight 180 lb (81.6 kg), not currently breastfeeding. General: well developed and well nourished in no acute distress. Body habitus is obese  HEENT: No rhinorrhea, sneezing, yawning, or lacrimation. No scleral icterus or conjunctival injection. Resp: symmetrical chest expansion, unlabored breathing, respirations unlabored. CV: Heart rate is regular. Peripheral pulses are palpable  Lymph: No visible regional lymphadenopathy. Skin: No rashes or ecchymosis. Normal turgor. Psych: Mood is calm. Affect is normal.   Ext: No edema noted     MSK:   Cervical Exam:   Inspection: Shoulder girdles were asymmetric. The cervical lordotic curvature was decreased. Palpatory exam: revealed tenderness along right upper and middle cervical paraspinals , no tenderness along upper, middle and lower spinous processes, tenderness of right upper trapezius muscle. There was spasm of the right cervical psp. There were trigger points. ROM: ROM decreased  Special/Provocative tests:   Spurling's maneuver was negative. Neurological Exam:  Strength:   R  L  Deltoid   5  5  Biceps   5  5  Triceps  5  5  Wrist Ext  5  5  Finger Abd  5  5  Hip Flex  5  5  Knee Ext  5  5  Ankle dorsi  5  5  EHL   5 5  Ankle Plantar  5  5    Sensory:  Intact for light touch in all upper and lower extremity dermatomes. Reflexes:   R  L  Biceps   (2+) (2+)  Triceps  (2+) (2+)  BR   (2+) (2+)  Patellar  (2+) (2+)  Ankle Jerk  (2+) (2+)    Harmon is negative bilaterally. Imaging: (personally reviewed by me 05/14/19)  Xray C spine   Multiple radiographs of cervical spine are obtained.       These images reveal a few millimeters anterior spondylolisthesis of C3   in relation to C4 and C4 in relation to C5. There is no definite   fracture or other spondylolisthesis. There is diffuse loss of disc   height, degenerative spondylosis, uncovertebral hypertrophy and facet   arthropathy, most notably at the lower cervical levels. There is   cervical kyphosis apex at C5-6. Diffuse bone demineralization is   present. Otherwise the regional soft tissue and osseous structures are   unremarkable.           Impression       Grade 1 anterolisthesis of C3 and C4 appears to be on the basis of   chronic degenerative disc and facet disease.       Cervical kyphosis which can be due to muscle spasm or positional.       Diffuse degenerative disc and facet disease most notably at the lower   cervical levels. Impression:   Lewis Tyson is a 79 y.o. female    1. Neck pain    2. Right arm pain    3. Claustrophobia        Plan:   · Will obtain MRI cervical spine for ongoing pain despite conservative treatment, now radiating into right arm. · Ativan provided for claustrophobia   Controlled Substances Monitoring:     RX Monitoring 5/14/2019   Attestation The Prescription Monitoring Report for this patient was reviewed today. Acute Pain Prescriptions -   Chronic Pain Routine Monitoring No signs of potential drug abuse or diversion identified: otherwise, see note documentation   ·   · Tylenol PRN      The patient was educated about the diagnosis, prognosis, indications, risks and benefits of treatment. An opportunity to ask questions was given to the patient and questions were answered. The patient agreed to proceed with the recommended treatment as described above.       Follow up after MRI         Jakob Penny DO, [facility]   Board Certified Physical Medicine and Rehabilitation

## 2019-05-31 DIAGNOSIS — M79.601 RIGHT ARM PAIN: ICD-10-CM

## 2019-05-31 DIAGNOSIS — M54.2 NECK PAIN: ICD-10-CM

## 2019-06-04 ENCOUNTER — TELEPHONE (OUTPATIENT)
Dept: PHYSICAL MEDICINE AND REHAB | Age: 69
End: 2019-06-04

## 2019-06-04 NOTE — TELEPHONE ENCOUNTER
Called and left message on patient voicemail to call the office to schedule an appointment to go over MRI results. Will wait for return call from patient.

## 2019-06-17 DIAGNOSIS — I10 ESSENTIAL HYPERTENSION: ICD-10-CM

## 2019-06-17 RX ORDER — AMLODIPINE BESYLATE 5 MG/1
TABLET ORAL
Qty: 90 TABLET | Refills: 1 | Status: SHIPPED | OUTPATIENT
Start: 2019-06-17 | End: 2019-07-01 | Stop reason: ALTCHOICE

## 2019-07-01 ENCOUNTER — OFFICE VISIT (OUTPATIENT)
Dept: PHYSICAL MEDICINE AND REHAB | Age: 69
End: 2019-07-01
Payer: MEDICARE

## 2019-07-01 VITALS
SYSTOLIC BLOOD PRESSURE: 120 MMHG | HEIGHT: 61 IN | BODY MASS INDEX: 33.79 KG/M2 | HEART RATE: 83 BPM | DIASTOLIC BLOOD PRESSURE: 72 MMHG | WEIGHT: 179 LBS

## 2019-07-01 DIAGNOSIS — M54.2 NECK PAIN: ICD-10-CM

## 2019-07-01 DIAGNOSIS — M50.30 DDD (DEGENERATIVE DISC DISEASE), CERVICAL: ICD-10-CM

## 2019-07-01 DIAGNOSIS — M54.12 CERVICAL RADICULAR PAIN: Primary | ICD-10-CM

## 2019-07-01 PROCEDURE — 4040F PNEUMOC VAC/ADMIN/RCVD: CPT | Performed by: PHYSICAL MEDICINE & REHABILITATION

## 2019-07-01 PROCEDURE — G8427 DOCREV CUR MEDS BY ELIG CLIN: HCPCS | Performed by: PHYSICAL MEDICINE & REHABILITATION

## 2019-07-01 PROCEDURE — 1123F ACP DISCUSS/DSCN MKR DOCD: CPT | Performed by: PHYSICAL MEDICINE & REHABILITATION

## 2019-07-01 PROCEDURE — 99214 OFFICE O/P EST MOD 30 MIN: CPT | Performed by: PHYSICAL MEDICINE & REHABILITATION

## 2019-07-01 PROCEDURE — G8417 CALC BMI ABV UP PARAM F/U: HCPCS | Performed by: PHYSICAL MEDICINE & REHABILITATION

## 2019-07-01 PROCEDURE — G8400 PT W/DXA NO RESULTS DOC: HCPCS | Performed by: PHYSICAL MEDICINE & REHABILITATION

## 2019-07-01 PROCEDURE — 1090F PRES/ABSN URINE INCON ASSESS: CPT | Performed by: PHYSICAL MEDICINE & REHABILITATION

## 2019-07-01 PROCEDURE — 1036F TOBACCO NON-USER: CPT | Performed by: PHYSICAL MEDICINE & REHABILITATION

## 2019-07-01 PROCEDURE — 3017F COLORECTAL CA SCREEN DOC REV: CPT | Performed by: PHYSICAL MEDICINE & REHABILITATION

## 2019-07-01 RX ORDER — GABAPENTIN 300 MG/1
300 CAPSULE ORAL 3 TIMES DAILY
Qty: 90 CAPSULE | Refills: 2 | Status: ON HOLD
Start: 2019-07-01 | End: 2022-06-17

## 2019-07-01 NOTE — PATIENT INSTRUCTIONS
NEURONTIN  Neurontin     AM        PM       BEDTIME    Day 0-3       -            -             300mg  Day 4-6       300       -              300mg  Day 7+        300       300         300mg    If you note any increase in depression symptoms after starting the Neurontin, please call the office. If you experience a side effect that you cannot tolerate, please do not stop Neurontin suddenly as this can result in a seizure. Call office at [phone] if you wish to stop taking it and we will give you instructions how to go off the medication.

## 2019-07-01 NOTE — PROGRESS NOTES
Arelis Olmstead, [id number] [facility]  [address] [facility] [address]  Phone: [phone]  Fax: [phone]    PCP: KAREN Ewing CNP  Date of visit: 7/1/19    Chief Complaint   Patient presents with    Neck Pain     Follow up, discuss MRI results       Interval:   Patient presents today for follow up and to review MRI results. MRI reviewed with patient. She has continued right sided neck and arm pain as prior. visit regarding neck pain. The pain is rated Pain Score:   6, is described as pulling, and is located in the right neck now radiating into right arm. The pain is better with heat. The pain is worse with turning head, lifting. There is associated numbness/tingling in right hand-- EMG reviewed- prognosis from CTS release bad as severe, axonal. There is no weakness. There is no bowel/bladder changes. The prior workup has included: Xray, MRI C spine    The prior treatment has included:  PT: currently with some relief   Chiropractic: none    Modalities: heat with no relief   OTC Tylenol: yes  NSAIDS: contraindicated-- kidney function     Opioids: norco, tramadol   Membrane stabilizers: none    Muscle relaxers: zanaflex   Previous injections: none    Previous surgery at this site: none      Allergies   Allergen Reactions    Tetracyclines & Related Hives    Z-Anant [Azithromycin] Palpitations    Bee Venom        Current Outpatient Medications   Medication Sig Dispense Refill    gabapentin (NEURONTIN) 300 MG capsule Take 1 capsule by mouth 3 times daily for 30 days.  90 capsule 2    simvastatin (ZOCOR) 40 MG tablet Take 1 tablet by mouth nightly 90 tablet 1    Handicap Placard MISC by Does not apply route Patient cannot walk 200 ft without stopping to rest.    Expiration *1/10/24 1 each 0    diphenhydrAMINE (BENADRYL) 25 MG capsule Take 25 mg by mouth every 6 hours as needed for Itching      sodium chloride (OCEAN, BABY AYR) 0.65 Physical Exam:   Blood pressure 120/72, pulse 83, height 5' 1\" (1.549 m), weight 179 lb (81.2 kg), not currently breastfeeding. General: well developed and well nourished in no acute distress. Body habitus is obese  HEENT: No rhinorrhea, sneezing, yawning, or lacrimation. No scleral icterus or conjunctival injection. Resp: symmetrical chest expansion, unlabored breathing, respirations unlabored. CV: Heart rate is regular. Peripheral pulses are palpable  Lymph: No visible regional lymphadenopathy. Skin: No rashes or ecchymosis. Normal turgor. Psych: Mood is calm. Affect is normal.   Ext: No edema noted     MSK:   Cervical Exam:   Inspection: Shoulder girdles were asymmetric. The cervical lordotic curvature was decreased. Palpatory exam: revealed tenderness along right upper and middle cervical paraspinals , no tenderness along upper, middle and lower spinous processes, tenderness of right upper trapezius muscle. There was spasm of the right cervical psp. There were trigger points. ROM: ROM decreased  Special/Provocative tests:   Spurling's maneuver was negative. Neurological Exam:  Strength:   R  L  Deltoid   5  5  Biceps   5  5  Triceps  5  5  Wrist Ext  5  5  Finger Abd  5  5  Hip Flex  5  5  Knee Ext  5  5  Ankle dorsi  5  5  EHL   5 5  Ankle Plantar  5  5    Sensory:  Intact for light touch in all upper and lower extremity dermatomes. Reflexes:   R  L  Biceps   (2+) (2+)  Triceps  (2+) (2+)  BR   (2+) (2+)  Patellar  (2+) (2+)  Ankle Jerk  (2+) (2+)    Harmon is negative bilaterally. Imaging: (personally reviewed by me 07/01/19)  Xray C spine   Multiple radiographs of cervical spine are obtained.       These images reveal a few millimeters anterior spondylolisthesis of C3   in relation to C4 and C4 in relation to C5. There is no definite   fracture or other spondylolisthesis.  There is diffuse loss of disc   height, degenerative spondylosis, uncovertebral hypertrophy and

## 2020-06-04 ENCOUNTER — HOSPITAL ENCOUNTER (EMERGENCY)
Age: 70
Discharge: HOME | End: 2020-06-04
Attending: EMERGENCY MEDICINE
Payer: MEDICARE

## 2020-06-04 VITALS
BODY MASS INDEX: 32.1 KG/M2 | SYSTOLIC BLOOD PRESSURE: 184 MMHG | DIASTOLIC BLOOD PRESSURE: 88 MMHG | HEART RATE: 78 BPM | TEMPERATURE: 98 F | OXYGEN SATURATION: 98 % | WEIGHT: 170 LBS | HEIGHT: 61 IN | RESPIRATION RATE: 20 BRPM

## 2020-06-04 LAB
ACETAMINOPHEN LEVEL: <5 MCG/ML (ref 10–30)
ALBUMIN SERPL-MCNC: 4.5 G/DL (ref 3.5–5.2)
ALP BLD-CCNC: 85 U/L (ref 35–104)
ALT SERPL-CCNC: 17 U/L (ref 0–32)
AMPHETAMINE SCREEN, URINE: NOT DETECTED
ANION GAP SERPL CALCULATED.3IONS-SCNC: 10 MMOL/L (ref 7–16)
AST SERPL-CCNC: 20 U/L (ref 0–31)
BARBITURATE SCREEN URINE: NOT DETECTED
BASOPHILS ABSOLUTE: 0.07 E9/L (ref 0–0.2)
BASOPHILS RELATIVE PERCENT: 1.1 % (ref 0–2)
BENZODIAZEPINE SCREEN, URINE: NOT DETECTED
BILIRUB SERPL-MCNC: 0.8 MG/DL (ref 0–1.2)
BUN BLDV-MCNC: 19 MG/DL (ref 8–23)
CALCIUM SERPL-MCNC: 9.7 MG/DL (ref 8.6–10.2)
CANNABINOID SCREEN URINE: NOT DETECTED
CHLORIDE BLD-SCNC: 103 MMOL/L (ref 98–107)
CO2: 24 MMOL/L (ref 22–29)
COCAINE METABOLITE SCREEN URINE: NOT DETECTED
CREAT SERPL-MCNC: 1.2 MG/DL (ref 0.5–1)
EOSINOPHILS ABSOLUTE: 0.13 E9/L (ref 0.05–0.5)
EOSINOPHILS RELATIVE PERCENT: 2.1 % (ref 0–6)
ETHANOL: <10 MG/DL (ref 0–0.08)
FENTANYL SCREEN, URINE: NOT DETECTED
GFR AFRICAN AMERICAN: 54
GFR NON-AFRICAN AMERICAN: 45 ML/MIN/1.73
GLUCOSE BLD-MCNC: 101 MG/DL (ref 74–99)
HCT VFR BLD CALC: 43.9 % (ref 34–48)
HEMOGLOBIN: 15 G/DL (ref 11.5–15.5)
IMMATURE GRANULOCYTES #: 0.02 E9/L
IMMATURE GRANULOCYTES %: 0.3 % (ref 0–5)
LYMPHOCYTES ABSOLUTE: 1.82 E9/L (ref 1.5–4)
LYMPHOCYTES RELATIVE PERCENT: 28.8 % (ref 20–42)
Lab: NORMAL
MCH RBC QN AUTO: 31.3 PG (ref 26–35)
MCHC RBC AUTO-ENTMCNC: 34.2 % (ref 32–34.5)
MCV RBC AUTO: 91.5 FL (ref 80–99.9)
METHADONE SCREEN, URINE: NOT DETECTED
MONOCYTES ABSOLUTE: 0.46 E9/L (ref 0.1–0.95)
MONOCYTES RELATIVE PERCENT: 7.3 % (ref 2–12)
NEUTROPHILS ABSOLUTE: 3.81 E9/L (ref 1.8–7.3)
NEUTROPHILS RELATIVE PERCENT: 60.4 % (ref 43–80)
OPIATE SCREEN URINE: NOT DETECTED
OXYCODONE URINE: NOT DETECTED
PDW BLD-RTO: 13.9 FL (ref 11.5–15)
PHENCYCLIDINE SCREEN URINE: NOT DETECTED
PLATELET # BLD: 280 E9/L (ref 130–450)
PMV BLD AUTO: 9.2 FL (ref 7–12)
POTASSIUM SERPL-SCNC: 4.3 MMOL/L (ref 3.5–5)
RBC # BLD: 4.8 E12/L (ref 3.5–5.5)
SALICYLATE, SERUM: <0.3 MG/DL (ref 0–30)
SODIUM BLD-SCNC: 137 MMOL/L (ref 132–146)
TOTAL PROTEIN: 8.3 G/DL (ref 6.4–8.3)
TRICYCLIC ANTIDEPRESSANTS SCREEN SERUM: NEGATIVE NG/ML
WBC # BLD: 6.3 E9/L (ref 4.5–11.5)

## 2020-06-04 PROCEDURE — 6370000000 HC RX 637 (ALT 250 FOR IP): Performed by: PHYSICIAN ASSISTANT

## 2020-06-04 PROCEDURE — 80053 COMPREHEN METABOLIC PANEL: CPT

## 2020-06-04 PROCEDURE — G0480 DRUG TEST DEF 1-7 CLASSES: HCPCS

## 2020-06-04 PROCEDURE — 99283 EMERGENCY DEPT VISIT LOW MDM: CPT

## 2020-06-04 PROCEDURE — 85025 COMPLETE CBC W/AUTO DIFF WBC: CPT

## 2020-06-04 PROCEDURE — 80307 DRUG TEST PRSMV CHEM ANLYZR: CPT

## 2020-06-04 RX ORDER — LORAZEPAM 1 MG/1
1 TABLET ORAL ONCE
Status: COMPLETED | OUTPATIENT
Start: 2020-06-04 | End: 2020-06-04

## 2020-06-04 RX ADMIN — LORAZEPAM 1 MG: 1 TABLET ORAL at 10:27

## 2020-06-04 NOTE — ED PROVIDER NOTES
300ng/mL    PCP Screen, Urine NOT DETECTED Negative < 25 ng/mL    Methadone Screen, Urine NOT DETECTED Negative <300 ng/mL    Oxycodone Urine NOT DETECTED Negative <100 ng/mL    FENTANYL SCREEN, URINE NOT DETECTED Negative <1 ng/mL    Drug Screen Comment: see below      Imaging: All Radiology results interpreted by Radiologist unless otherwise noted. No orders to display     ED Course / Medical Decision Making     Medications   LORazepam (ATIVAN) tablet 1 mg (1 mg Oral Given 6/4/20 1027)     Re-examination:   Patient continues to rest comfortably in bed with no distress or complaints. Consult(s):  IP CONSULT TO SOCIAL WORK    Procedure(s):  None    MDM:       Counseling: The emergency provider has spoken with the patient/caregiver and/or gaurdian and discussed todays results, in addition to providing specific details for the plan of care and counseling regarding the diagnosis and prognosis. Questions are answered at this time and they are agreeable with the plan. Patient will be given outpatient follow-up from . She was in no distress at discharge. She was advised to follow-up with her family doctor. She was also advised to start already prescribed Seroquel but to not take that with her Ambien or trazodone. Patient was able to ambulate out of department with no difficulty. Her vitals are stable. Patient has no suicidal or homicidal ideation. Assessment      1. Insomnia, unspecified type    2. Anxiety state      Plan   Discharge to home  Patient condition is good    New Medications     Discharge Medication List as of 6/4/2020 12:59 PM          Electronically signed by Saul Almaraz PA-C   DD: 6/4/20  **This report was transcribed using voice recognition software. Every effort was made to ensure accuracy; however, inadvertent computerized transcription errors may be present.     END OF ED PROVIDER NOTE        Saul Almaraz PA-C  06/04/20 3306

## 2020-06-05 ENCOUNTER — HOSPITAL ENCOUNTER (OUTPATIENT)
Dept: PSYCHIATRY | Age: 70
Setting detail: THERAPIES SERIES
Discharge: HOME | End: 2020-06-05
Payer: MEDICARE

## 2020-06-05 PROCEDURE — 90791 PSYCH DIAGNOSTIC EVALUATION: CPT

## 2020-06-05 ASSESSMENT — SLEEP AND FATIGUE QUESTIONNAIRES
DIFFICULTY STAYING ASLEEP: YES
DIFFICULTY ARISING: NO
RESTFUL SLEEP: NO
DIFFICULTY FALLING ASLEEP: YES
DO YOU HAVE DIFFICULTY SLEEPING: YES
DO YOU USE A SLEEP AID: YES
SLEEP PATTERN: RESTLESSNESS;DISTURBED/INTERRUPTED SLEEP;DIFFICULTY FALLING ASLEEP

## 2020-06-05 ASSESSMENT — ANXIETY QUESTIONNAIRES
5. BEING SO RESTLESS THAT IT IS HARD TO SIT STILL: 3-NEARLY EVERY DAY
2. NOT BEING ABLE TO STOP OR CONTROL WORRYING: 2-OVER HALF THE DAYS
1. FEELING NERVOUS, ANXIOUS, OR ON EDGE: 3-NEARLY EVERY DAY
6. BECOMING EASILY ANNOYED OR IRRITABLE: 1-SEVERAL DAYS
4. TROUBLE RELAXING: 2-OVER HALF THE DAYS
GAD7 TOTAL SCORE: 17
3. WORRYING TOO MUCH ABOUT DIFFERENT THINGS: 3-NEARLY EVERY DAY
7. FEELING AFRAID AS IF SOMETHING AWFUL MIGHT HAPPEN: 3-NEARLY EVERY DAY

## 2020-06-05 ASSESSMENT — PATIENT HEALTH QUESTIONNAIRE - PHQ9: SUM OF ALL RESPONSES TO PHQ QUESTIONS 1-9: 23

## 2020-06-08 ENCOUNTER — HOSPITAL ENCOUNTER (OUTPATIENT)
Dept: PSYCHIATRY | Age: 70
Setting detail: THERAPIES SERIES
Discharge: HOME | End: 2020-06-08
Payer: MEDICARE

## 2020-06-08 PROCEDURE — G0410 GRP PSYCH PARTIAL HOSP 45-50: HCPCS

## 2020-06-08 PROCEDURE — 90853 GROUP PSYCHOTHERAPY: CPT

## 2020-06-08 PROCEDURE — 99443 PR PHYS/QHP TELEPHONE EVALUATION 21-30 MIN: CPT | Performed by: PSYCHIATRY & NEUROLOGY

## 2020-06-08 RX ORDER — PAROXETINE HYDROCHLORIDE 20 MG/1
20 TABLET, FILM COATED ORAL NIGHTLY
Qty: 30 TABLET | Refills: 0 | Status: SHIPPED | OUTPATIENT
Start: 2020-06-08 | End: 2020-07-06 | Stop reason: SDUPTHER

## 2020-06-08 NOTE — H&P
PSYCHIATRIC EVALUATION      CHIEF COMPLAINT:  \"I wasn't sleeping. \"    HISTORY OF PRESENT ILLNESS: Bard Tavares is a 81 y.o. female with history of treatment for anxiety/depression who presents for psychiatric evaluation at [facility] as a referral from [facility]. Spoke with patient on the telephone due to coronavirus precaution which she consents to. Patient location was home. Provider location was [facility]. Patient was cooperative and provides good history. Adebayo Lucas reports increased symptoms of depression and generalized anxiety over the last month in the context of the pandemic and social distancing. She usually goes to [employer], the [place] [facility], and lunch with friends but has been unable to do these activities. She acknowledges symptoms of depressed mood, poor sleep, poor concentration, racing thoughts, worries and inability to relax. She saw her primary care physician Dr. Rodney Hobbs for sleep medication and was initially put on Ambien and then Trazodone which she reports caused her suicidal thoughts. She was then prescribed Seroquel 200 mg but in the meantime was becoming more anxious so she actually went to the hospital on 6/4/20 but was ultimately discharged. Patient reports she has been taking the Seroquel the last four nights and has been sleeping better. She used to be on Paxil in the past and is open to going back on this to help her symptoms. She denies active suicidal ideations, intentions or plans. She is not homicidal, manic or psychotic. PAST PSYCHIATRIC HISTORY: Patient used to be in outpatient treatment at [facility]. She currently does not have a psychiatrist or therapist. She had a psychiatrist admission at age 25 following a suicide attempt by overdose.     PAST MEDICAL HISTORY:       Diagnosis Date    Allergic rhinitis     Anxiety     Dyslexia     GERD (gastroesophageal reflux disease)     Hyperlipidemia     Hypertension     Multiple sclerosis ([facility] [address] 1980   

## 2020-06-09 ENCOUNTER — HOSPITAL ENCOUNTER (OUTPATIENT)
Dept: PSYCHIATRY | Age: 70
Setting detail: THERAPIES SERIES
Discharge: HOME | End: 2020-06-09
Payer: MEDICARE

## 2020-06-09 PROCEDURE — G0410 GRP PSYCH PARTIAL HOSP 45-50: HCPCS

## 2020-06-09 PROCEDURE — 90853 GROUP PSYCHOTHERAPY: CPT

## 2020-06-09 NOTE — PLAN OF CARE
This virtual group therapy visit was conducted via:     [] Telephone    [x] Video      Patient Location:     [x] Patient's Home  [] Other:      Provider Location (City/State):     [x] [place] [facility]   [] Teja Darby         Date: 6/9/20    Start Time: 10:10 AM    End Time:  11:45 AM    Number of Participants: 4    Type of Group: Recovery    Patient's Goal:  To gain insight and awareness into the cognitive model and complete practice exercises. Notes: Pt was actively engaged in group discussion and activity. Pt shared once prompted by sw. Pt was able to effectively complete practice exercises. Pt appeared to understand the cognitive model. Status After Intervention:  Unchanged    Participation Level:  Active Listener and Interactive    Participation Quality: Appropriate, Attentive, and Sharing    Speech:  normal    Thought Process/Content: Logical  Linear    Affective Functioning: Congruent    Mood: anxious    Level of consciousness:  Alert, Oriented x4, and Attentive    Response to Learning: Able to verbalize current knowledge/experience, Able to verbalize/acknowledge new learning, and Able to retain information    Endings: None Reported    Modes of Intervention: Support, Socialization, and Exploration    Discipline Responsible: /Counselor

## 2020-06-11 ENCOUNTER — HOSPITAL ENCOUNTER (OUTPATIENT)
Dept: PSYCHIATRY | Age: 70
Setting detail: THERAPIES SERIES
Discharge: HOME | End: 2020-06-11
Payer: MEDICARE

## 2020-06-11 PROCEDURE — G0410 GRP PSYCH PARTIAL HOSP 45-50: HCPCS

## 2020-06-11 PROCEDURE — 90853 GROUP PSYCHOTHERAPY: CPT

## 2020-06-11 NOTE — PLAN OF CARE
This virtual group therapy visit was conducted via:     [] Telephone    [x] Video      Patient Location:     [x] Patient's Home  [] Other:      Provider Location (City/State):     [place] [facility]   [] Hilton Purdy         Date: 6/11/20    Start Time: 10:15 AM    End Time: 11:45 AM    Number of Participants: 5    Type of Group: Recovery    Patient's Goal:  To gain insight and awareness into cognitive distortions. Notes: Pt was actively engaged in group discussion and activity. Reviewed pt's homework from previous session. Pt was able to provide personal examples of cognitive distortions she tends to utilize. Pt acknowledged how these irrational thoughts impact her emotions and behaviors. Status After Intervention:  Unchanged    Participation Level:  Active Listener and Interactive    Participation Quality: Appropriate and Attentive    Speech:  normal    Thought Process/Content: Logical  Linear    Affective Functioning: Congruent    Mood: anxious    Level of consciousness:  Alert, Oriented x4, and Attentive    Response to Learning: Able to verbalize current knowledge/experience, Able to verbalize/acknowledge new learning, and Able to retain information    Endings: None Reported    Modes of Intervention: Education, Support, Socialization, Exploration, Clarifying, Problem-solving, and Activity    Discipline Responsible: /Counselor

## 2020-06-15 ENCOUNTER — HOSPITAL ENCOUNTER (OUTPATIENT)
Dept: PSYCHIATRY | Age: 70
Setting detail: THERAPIES SERIES
Discharge: HOME | End: 2020-06-15
Payer: MEDICARE

## 2020-06-15 NOTE — PLAN OF CARE
This virtual visit was conducted via:     [x] Telephone    [] Video      Patient Location:     [x] Patient's Home  [] Other:      Provider Location (City/State):     [x] [place] [facility]   [] Pia Castañeda       Date: 6/15/20    Start time: 9:42 AM    End time: 9:52 AM       Session Notes: Spoke with pt on the phone for individual check-in since sw was unable to conduct IOP group today. Pt reported that she had one good night of sleep over the weekend which was a relief for her. Pt reported her anxiety comes and goes. Explored pt's triggers with her and she has limited insight into this. Discussed progress pt is making in groups. When asked pt if she was getting anything out of group therapy she stated \"not really\". Reviewed various topics we have discussed last week and plans we will continue working towards. Pt agreeable to continue engaging in program and working towards treatment goals. Status After Intervention:  Unchanged    Participation Level: Active Listener and Interactive    Participation Quality: Appropriate, Attentive, and Sharing    Speech:  Normal rate and tone    Thought Process/Content: Logical  Linear    Level of consciousness:  Alert, Oriented x4, and Attentive    Response to Learning: Able to verbalize current knowledge/experience and Able to retain information    Endings: None Reported    Discipline Responsible: /Counselor       This is non-billable documentation.

## 2020-06-16 ENCOUNTER — HOSPITAL ENCOUNTER (OUTPATIENT)
Dept: PSYCHIATRY | Age: 70
Setting detail: THERAPIES SERIES
Discharge: HOME | End: 2020-06-16
Payer: MEDICARE

## 2020-06-16 PROCEDURE — G0410 GRP PSYCH PARTIAL HOSP 45-50: HCPCS

## 2020-06-16 PROCEDURE — 90853 GROUP PSYCHOTHERAPY: CPT

## 2020-06-16 NOTE — PLAN OF CARE
This virtual group therapy visit was conducted via:     [] Telephone    [x] Video      Patient Location:     [x] Patient's Home  [] Other:      Provider Location (City/State):     [x] [place] [facility]   [] White Lah         Date: 6/16/20    Start Time: 8:30 AM    End Time: 10:00 AM    Number of Participants: 4    Type of Group: Psychotherapy    Patient's Goal: To increase socialization and improve interpersonal relationships. Notes: Themes of group focused on coping with behavioral health symptoms. Pt engaged in group once prompted by sw. Pt was able to relate to other group members that shared. Pt identified barriers towards making progress. Pt processed emotions of frustrations related to barriers. Status After Intervention:  Unchanged    Participation Level:  Active Listener and Interactive    Participation Quality: Appropriate, Attentive, and Sharing    Speech:  normal    Thought Process/Content: Logical  Linear    Affective Functioning: Congruent    Mood: euthymic    Level of consciousness:  Alert, Oriented x4, and Attentive    Response to Learning: Able to verbalize current knowledge/experience, Able to verbalize/acknowledge new learning, and Able to retain information    Endings: None Reported    Modes of Intervention: Support, Socialization, and Exploration    Discipline Responsible: /Counselor

## 2020-06-18 ENCOUNTER — HOSPITAL ENCOUNTER (OUTPATIENT)
Dept: PSYCHIATRY | Age: 70
Setting detail: THERAPIES SERIES
Discharge: HOME | End: 2020-06-18
Payer: MEDICARE

## 2020-06-18 PROCEDURE — G0410 GRP PSYCH PARTIAL HOSP 45-50: HCPCS

## 2020-06-18 PROCEDURE — 90853 GROUP PSYCHOTHERAPY: CPT

## 2020-06-22 ENCOUNTER — HOSPITAL ENCOUNTER (OUTPATIENT)
Dept: PSYCHIATRY | Age: 70
Setting detail: THERAPIES SERIES
Discharge: HOME | End: 2020-06-22
Payer: MEDICARE

## 2020-06-22 PROCEDURE — 90853 GROUP PSYCHOTHERAPY: CPT

## 2020-06-22 PROCEDURE — G0410 GRP PSYCH PARTIAL HOSP 45-50: HCPCS

## 2020-06-23 ENCOUNTER — HOSPITAL ENCOUNTER (OUTPATIENT)
Dept: PSYCHIATRY | Age: 70
Setting detail: THERAPIES SERIES
Discharge: HOME | End: 2020-06-23
Payer: MEDICARE

## 2020-06-23 PROCEDURE — 90853 GROUP PSYCHOTHERAPY: CPT

## 2020-06-23 PROCEDURE — G0410 GRP PSYCH PARTIAL HOSP 45-50: HCPCS

## 2020-06-23 NOTE — PLAN OF CARE
This virtual group therapy visit was conducted via:     [] Telephone    [x] Video      Patient Location:     [x] Patient's Home  [] Other:      Provider Location (City/State):     [x] [place] [facility]   [] Sean Zeng         Date: 6/23/20    Start Time: 8:30 AM     End Time: 9:45 AM    Number of Participants: 3    Type of Group: Psychotherapy    Patient's Goal: To increase socialization and improve interpersonal relationships. Notes: Themes of group focused on processing emotions related to factors outside of their control. Pt easily engaged in group. Pt was able to relate to others that shared regarding child/parent dynamics contributing to anxiety. Pt shared what has been helpful to her in order to find acceptance over situations. Pt was tangential at times throughout group. Pt provided supportive feedback to others. Status After Intervention:  Improved    Participation Level:  Active Listener and Interactive    Participation Quality: Appropriate, Attentive, and Sharing    Speech:  normal    Thought Process/Content: Logical  Linear    Affective Functioning: Congruent    Mood: euthymic    Level of consciousness:  Alert, Oriented x4, and Attentive    Response to Learning: Able to verbalize current knowledge/experience, Able to verbalize/acknowledge new learning, and Able to retain information    Endings: None Reported    Modes of Intervention: Support, Socialization, and Exploration    Discipline Responsible: /Counselor

## 2020-06-29 ENCOUNTER — HOSPITAL ENCOUNTER (OUTPATIENT)
Dept: PSYCHIATRY | Age: 70
Setting detail: THERAPIES SERIES
Discharge: HOME | End: 2020-06-29
Payer: MEDICARE

## 2020-06-29 PROCEDURE — 90853 GROUP PSYCHOTHERAPY: CPT

## 2020-06-29 PROCEDURE — G0410 GRP PSYCH PARTIAL HOSP 45-50: HCPCS

## 2020-06-29 NOTE — PLAN OF CARE
This virtual group therapy visit was conducted via:     [] Telephone    [x] Video      Patient Location:     [x] Patient's Home  [] Other:      Provider Location (City/State):     [x] [place] [facility]   [] Esha Hagan         Date: 6/29/20    Start Time: 10:15 AM    End Time: 11:45 AM    Number of Participants: 5    Type of Group: Recovery    Patient's Goal: Identify relaxation techniques that can be beneficial when the fight or flight response is triggered. Notes: Pt was actively engaged in group discussion and activity. Pt was open towards topic and participated in relaxation skills. Status After Intervention:  Improved    Participation Level:  Active Listener and Interactive    Participation Quality: Appropriate, Attentive, and Sharing    Speech:  normal    Thought Process/Content: Logical  Linear    Affective Functioning: Congruent    Mood: euthymic    Level of consciousness:  Alert, Oriented x4, and Attentive    Response to Learning: Able to verbalize current knowledge/experience, Able to verbalize/acknowledge new learning, and Able to retain information    Endings: None Reported    Modes of Intervention: Education, Support, Socialization, Exploration, Clarifying, Problem-solving, and Activity    Discipline Responsible: /Counselor

## 2020-06-29 NOTE — PLAN OF CARE
This virtual group therapy visit was conducted via:     [] Telephone    [x] Video      Patient Location:     [x] Patient's Home  [] Other:      Provider Location (City/State):     [x] [place] [facility]   [] [facility]         Date: 6/29/20    Start Time: 8:30 AM    End Time: 10:00 AM    Number of Participants: 5    Type of Group: Psychotherapy    Patient's Goal: To increase socialization and improve interpersonal relationships. Notes: Themes of group focused on processing emotions of fear and loneliness. Pt easily engaged in group. Pt expressed difficulty coping with loneliness since the beginning of the pandemic. Pt reported finding it to be beneficial to be able to converse with other group members. Pt related to others that shared regarding difficulty adjusting to change. Pt identified ways she is attempting to adapt to changes and cope in a healthy manner. Pt gave others support and feedback. Status After Intervention: Improved    Participation Level:  Active Listener and Interactive    Participation Quality: Appropriate, Attentive, and Sharing    Speech:  normal    Thought Process/Content: Logical  Linear    Affective Functioning: Congruent    Mood: euthymic    Level of consciousness:  Alert, Oriented x4, and Attentive    Response to Learning: Able to verbalize current knowledge/experience, Able to verbalize/acknowledge new learning, and Able to retain information    Endings: None Reported    Modes of Intervention: Support, Socialization, and Exploration    Discipline Responsible: /Counselor

## 2020-06-30 ENCOUNTER — HOSPITAL ENCOUNTER (OUTPATIENT)
Dept: PSYCHIATRY | Age: 70
Setting detail: THERAPIES SERIES
Discharge: HOME | End: 2020-06-30
Payer: MEDICARE

## 2020-06-30 PROCEDURE — 90853 GROUP PSYCHOTHERAPY: CPT

## 2020-06-30 PROCEDURE — G0410 GRP PSYCH PARTIAL HOSP 45-50: HCPCS

## 2020-06-30 NOTE — PLAN OF CARE
This virtual group therapy visit was conducted via:     [] Telephone    [x] Video      Patient Location:     [x] Patient's Home  [] Other:      Provider Location (City/State):     [x] [place] [facility]   [] Edwardo Munguia         Date: 6/30/20    Start Time: 8:30 AM    End Time: 10:00 AM    Number of Participants: 4    Type of Group: Psychotherapy    Patient's Goal:  To increase socialization and improve interpersonal relationships. Notes: Themes of group focused on processing emotions related to anxiety symptoms. Pt arrived late for group but engaged towards the end of session. Pt shared minimally regarding stressors stemming from pandemic. Pt identified present feelings of anger related to these stressors. Pt was receptive of support validating her feelings. Status After Intervention:  Improved    Participation Level: Active Listener and Interactive    Participation Quality: Appropriate, Attentive, and Sharing    Speech:  normal    Thought Process/Content: Logical  Linear    Affective Functioning: Congruent    Mood: euthymic    Level of consciousness:  Alert, Oriented x4, and Attentive    Response to Learning: Able to verbalize current knowledge/experience, Able to verbalize/acknowledge new learning, and Able to retain information    Endings: None Reported    Modes of Intervention: Support, Socialization, and Exploration    Discipline Responsible: /Counselor     This is non-billable documentation.

## 2020-06-30 NOTE — PLAN OF CARE
This virtual group therapy visit was conducted via:     [] Telephone    [x] Video      Patient Location:     [x] Patient's Home  [] Other:      Provider Location (City/State):     [place] [facility]   [] Vinnie Alia         Date: 6/30/20    Start Time: 10:15 AM    End Time: 11:45 AM    Number of Participants: 3    Type of Group: Recovery    Patient's Goal: To gain insight and awareness into daily hassle and life event stressors contributing to mental health symptoms. Notes: Pt was actively engaged in group discussion and activity. Pt identified stressors contributing to presenting symptoms. Pt was open towards topic and expressed insight into problems. Status After Intervention:  Improved    Participation Level:  Active Listener and Interactive    Participation Quality: Appropriate, Attentive, and Sharing    Speech:  normal    Thought Process/Content: Logical  Linear    Affective Functioning: Congruent    Mood: euthymic    Level of consciousness:  Alert, Oriented x4, and Attentive    Response to Learning: Able to verbalize current knowledge/experience, Able to verbalize/acknowledge new learning, and Able to retain information    Endings: None Reported    Modes of Intervention: Education, Support, Socialization, Exploration, Clarifying, Problem-solving, and Activity    Discipline Responsible: /Counselor

## 2020-07-02 ENCOUNTER — HOSPITAL ENCOUNTER (OUTPATIENT)
Dept: PSYCHIATRY | Age: 70
Setting detail: THERAPIES SERIES
Discharge: HOME | End: 2020-07-02
Payer: MEDICARE

## 2020-07-02 PROCEDURE — 90853 GROUP PSYCHOTHERAPY: CPT

## 2020-07-02 NOTE — PLAN OF CARE
This virtual group therapy visit was conducted via:     [] Telephone    [x] Video      Patient Location:     [x] Patient's Home  [] Other:      Provider Location (City/State):     [x] [place]   [] Mario Rao         Date: 7/2/20    Start Time: 8:30 AM    End Time: 10:00 AM    Number of Participants: 6    Type of Group: Psychotherapy    Patient's Goal:  To increase socialization and improve interpersonal relationships. Notes: Themes of group focused on child-parent dynamics and processing emotions of guilt. Pt easily engaged in group. Pt reported experiencing some anxiety. Pt was able to relate to other group members that shared. Pt provided support and feedback to other group members. Pt identified what has been helpful to her in the past in order to cope with child-parent dynamics. Status After Intervention:  Improved    Participation Level:  Active Listener and Interactive    Participation Quality: Appropriate, Attentive, and Sharing    Speech:  normal    Thought Process/Content: Logical  Linear    Affective Functioning: Congruent    Mood: euthymic    Level of consciousness:  Alert, Oriented x4, and Attentive    Response to Learning: Able to verbalize current knowledge/experience, Able to verbalize/acknowledge new learning, and Able to retain information    Endings: None Reported    Modes of Intervention: Support, Socialization, and Exploration    Discipline Responsible: /Counselor

## 2020-07-06 ENCOUNTER — HOSPITAL ENCOUNTER (OUTPATIENT)
Dept: PSYCHIATRY | Age: 70
Setting detail: THERAPIES SERIES
Discharge: HOME | End: 2020-07-06
Payer: MEDICARE

## 2020-07-06 PROCEDURE — 90853 GROUP PSYCHOTHERAPY: CPT

## 2020-07-06 PROCEDURE — 99441 PR PHYS/QHP TELEPHONE EVALUATION 5-10 MIN: CPT | Performed by: PSYCHIATRY & NEUROLOGY

## 2020-07-06 RX ORDER — MIRTAZAPINE 15 MG/1
15 TABLET, FILM COATED ORAL NIGHTLY
Qty: 30 TABLET | Refills: 0 | Status: SHIPPED | OUTPATIENT
Start: 2020-07-06 | End: 2020-07-31 | Stop reason: SDUPTHER

## 2020-07-06 RX ORDER — PAROXETINE HYDROCHLORIDE 20 MG/1
20 TABLET, FILM COATED ORAL NIGHTLY
Qty: 30 TABLET | Refills: 0 | Status: SHIPPED | OUTPATIENT
Start: 2020-07-06 | End: 2020-07-31 | Stop reason: SDUPTHER

## 2020-07-06 NOTE — PLAN OF CARE
This virtual group therapy visit was conducted via:     [] Telephone    [x] Video      Patient Location:     [x] Patient's Home  [] Other:      Provider Location (City/State):     [x] [place] [facility]   [] Oniel Balbuena         Date: 7/6/20    Start Time: 8:30 AM    End Time: 10:00 AM    Number of Participants: 3    Type of Group: Psychotherapy    Patient's Goal:  To increase socialization and improve interpersonal relationships. Notes: Themes of group focused on processing feelings of fear and utilizing their support system. Pt was open towards group and easily engaged. Pt identified feelings of anger stemming from external stressors. Pt reported being able to relate to others that shared regarding their anxiety and depression. Pt shared what has been helpful for her in the past to cope with anxiety. Pt gave feedback to another group member and reported being open with her support systems has been beneficial.    Status After Intervention:  Unchanged    Participation Level:  Active Listener and Interactive    Participation Quality: Appropriate, Attentive, and Sharing    Speech:  normal    Thought Process/Content: Logical  Linear    Affective Functioning: Congruent    Mood: euthymic    Level of consciousness:  Alert, Oriented x4, and Attentive    Response to Learning: Able to verbalize current knowledge/experience, Able to verbalize/acknowledge new learning, and Able to retain information    Endings: None Reported    Modes of Intervention: Support, Socialization, and Exploration    Discipline Responsible: /Counselor

## 2020-07-06 NOTE — BH NOTE
PSYCHIATRY ATTENDING NOTE    CC: \"Wondeful. \"    S: Patient being seen at in follow-up for depression and anxiety. Remeron was started last appointment. Spoke with patient on the phone today due to coronavirus precautions which she consented to. Patient location home. Provider location [facility] [place]. Norma Newton reports she is doing very well. Mood has been much better. Her anxiety and worry have been dissipating. Sleep is much improved with Remeron. Tolerating medications without incident. Patient reports group therapy sessions have been therapeutic. She is back doing some of the activities she enjoys although being caution with co-vid. No acute issues. Discussed timeline for discharge and transition of care. MSE: Pleasant, cooperative, forthcoming. Mood calmer. Speech clear. Thoughts organized. Content future-oriented. No SI, HI, paranoia, delusions, hallucinations. Orientation, concentration, recent and remote memory are grossly intact. Fund of knowledge fair. Language use fair. Insight and judgment fair. MEDICATIONS:  Paxil 20 mg nightly (cont)                                      Remeron 15 mg (cont)    ASSESSMENT:  MDD recurrent moderate                                      SHANNA    PLAN: Continue IOP and current medications. Patient responding well to treatment interventions. Speak with patient in two weeks and likely discharge soon thereafter.  Discuss with team.     Total time spent 9 min                          Electronically signed by Vinny Bernstein MD on 7/6/2020 at 12:19 PM

## 2020-07-06 NOTE — PLAN OF CARE
This virtual group therapy visit was conducted via:     [] Telephone    [x] Video      Patient Location:     [x] Patient's Home  [] Other:      Provider Location (City/State):     [x] [place] [facility]   [] Merlinda Alto         Date: 7/6/20    Start Time: 10:15 AM    End Time: 11:45 AM    Number of Participants: 4    Type of Group: Recovery    Patient's Goal: To gain insight and awareness into how thoughts affect mood, flexible vs non-flexible thoughts, and constructive vs destructive thoughts. Notes: Pt was actively engaged in group discussion and activity. Pt was open towards topic. Pt gave specific examples that demonstrated her ability to understand topic. Status After Intervention:  Improved    Participation Level:  Active Listener and Interactive    Participation Quality: Appropriate, Attentive, and Sharing    Speech:  normal    Thought Process/Content: Logical  Linear    Affective Functioning: Congruent    Mood: euthymic    Level of consciousness:  Alert, Oriented x4, and Attentive    Response to Learning: Able to verbalize current knowledge/experience, Able to verbalize/acknowledge new learning, and Able to retain information    Endings: None Reported    Modes of Intervention: Education, Support, Socialization, Exploration, Clarifying, Problem-solving, and Activity    Discipline Responsible: /Counselor

## 2020-07-07 ENCOUNTER — HOSPITAL ENCOUNTER (OUTPATIENT)
Dept: PSYCHIATRY | Age: 70
Setting detail: THERAPIES SERIES
Discharge: HOME | End: 2020-07-07
Payer: MEDICARE

## 2020-07-07 PROCEDURE — 90853 GROUP PSYCHOTHERAPY: CPT

## 2020-07-07 NOTE — PLAN OF CARE
This virtual group therapy visit was conducted via:     [] Telephone    [x] Video      Patient Location:     [x] Patient's Home  [] Other:      Provider Location (City/State):     [x] [place] [facility]   [] Juan Carlos Rasheed         Date: 7/7/20    Start Time: 10:15 AM    End Time: 11:45 AM    Number of Participants: 5    Type of Group: Recovery    Patient's Goal:  To gain insight and awareness into triggers. Notes: Pt was actively engaged in group discussion and activity. Pt was able to identify specific triggers that contribute to her mental health problems. Status After Intervention:  Improved    Participation Level:  Active Listener and Interactive    Participation Quality: Appropriate, Attentive, and Sharing    Speech:  normal    Thought Process/Content: Logical  Linear    Affective Functioning: Congruent    Mood: euthymic    Level of consciousness:  Alert, Oriented x4, and Attentive    Response to Learning: Able to verbalize current knowledge/experience and Able to verbalize/acknowledge new learning    Endings: None Reported    Modes of Intervention: Education, Support, Socialization, Exploration, Clarifying, Problem-solving, and Activity    Discipline Responsible: /Counselor

## 2020-07-07 NOTE — PLAN OF CARE
This virtual group therapy visit was conducted via:     [] Telephone    [x] Video      Patient Location:     [x] Patient's Home  [] Other:      Provider Location (City/State):     [x[place]   [] Arlene Garcia         Date: 7/7/20    Start Time: 8:30 AM    End Time: 10:00 AM    Number of Participants: 5    Type of Group: Psychotherapy    Patient's Goal:  To increase socialization and improve interpersonal relationships. Notes: Themes of group focused on processing present emotions stemming from past stressors. Pt easily engaged in group. Pt reported improvement in mood as compared to previous sessions. Pt was able to relate to other group members that shared about past trauma. Pt shared how previous stressors have contributed to present symptoms and ability to cope. Pt gave support and feedback to other group members. Status After Intervention:  Improved    Participation Level:  Active Listener and Interactive    Participation Quality: Appropriate, Attentive, and Sharing    Speech:  normal    Thought Process/Content: Logical  Linear    Affective Functioning: Congruent    Mood: euthymic    Level of consciousness:  Alert, Oriented x4, and Attentive    Response to Learning: Able to verbalize current knowledge/experience, Able to verbalize/acknowledge new learning, and Able to retain information    Endings: None Reported    Modes of Intervention: Support, Socialization, and Exploration    Discipline Responsible: /Counselor

## 2020-07-09 ENCOUNTER — HOSPITAL ENCOUNTER (OUTPATIENT)
Dept: PSYCHIATRY | Age: 70
Setting detail: THERAPIES SERIES
Discharge: HOME | End: 2020-07-09
Payer: MEDICARE

## 2020-07-09 PROCEDURE — 90853 GROUP PSYCHOTHERAPY: CPT

## 2020-07-09 NOTE — PLAN OF CARE
This virtual group therapy visit was conducted via:     [] Telephone    [x] Video      Patient Location:     [x] Patient's Home  [] Other:      Provider Location (City/State):     [x] [place], [facility]   [] Zenia Ramos         Date: 7/9/20    Start Time: 10:15    End Time:  11:45    Number of Participants: 4    Type of Group: Recovery    Patient's Goal:  To gain insight and understanding of distress tolerance and coping skills. Notes:  Pt was engaged during group discussion and activity. Pt was open towards topic and shared minimally. Status After Intervention:  Unchanged    Participation Level:  Active Listener and Interactive    Participation Quality: Appropriate, Attentive, and Sharing    Speech:  normal    Thought Process/Content: Logical  Linear    Affective Functioning: Congruent    Mood: anxious    Level of consciousness:  Alert, Oriented x4, and Attentive    Response to Learning: Able to verbalize current knowledge/experience, Able to verbalize/acknowledge new learning, and Able to retain information    Endings: None Reported    Modes of Intervention: Education, Support, Socialization, Exploration, Clarifying, Problem-solving, and Activity    Discipline Responsible: /Counselor

## 2020-07-10 ENCOUNTER — SOCIAL WORK (OUTPATIENT)
Dept: PSYCHIATRY | Age: 70
End: 2020-07-10

## 2020-07-10 NOTE — CARE COORDINATION
Discussed pt's progress in treatment team meeting on 7/9. Pt has made moderate progress towards treatment goals as evidenced by use of coping mechanisms outside of group. Pt reported connecting with others, reading, and going for walks has been contributing to decrease in anxiety and depression. Discussed need for referrals to outpatient services prior to discharge from program. Will continue to monitor success with interventions.

## 2020-07-13 ENCOUNTER — HOSPITAL ENCOUNTER (OUTPATIENT)
Dept: PSYCHIATRY | Age: 70
Setting detail: THERAPIES SERIES
Discharge: HOME | End: 2020-07-13
Payer: MEDICARE

## 2020-07-13 PROCEDURE — 90853 GROUP PSYCHOTHERAPY: CPT

## 2020-07-13 NOTE — PLAN OF CARE
This virtual group therapy visit was conducted via:     [] Telephone    [x] Video      Patient Location:     [x] Patient's Home  [] Other:      Provider Location (City/State):     [x] [place] [facility]   [] Vinnie Lesser         Date: 7/13/20    Start Time: 10:15    End Time:  11:45    Number of Participants: 5    Type of Group: Recovery    Patient's Goal:  Group dicussed healthy boundaries and different types of boundaries. Group processed how boundaries impact relationships. Notes:  Pt was engaged during group and participated in discussion. Pt identified how boundaries have impacted relationships, specifically discussing assertiveness as a challenge. Pt offered feedback. Status After Intervention:  Unchanged    Participation Level:  Active Listener and Interactive    Participation Quality: Appropriate, Attentive, and Sharing    Speech:  normal    Thought Process/Content: Logical  Linear    Affective Functioning: Congruent    Mood: euphoric    Level of consciousness:  Alert and Oriented x4    Response to Learning: Able to verbalize current knowledge/experience    Endings: None Reported    Modes of Intervention: Education, Support, Socialization, Exploration, Clarifying, Problem-solving, and Activity    Discipline Responsible: /Counselor

## 2020-07-13 NOTE — PLAN OF CARE
This virtual group therapy visit was conducted via:     [] Telephone    [x] Video      Patient Location:     [x] Patient's Home  [] Other:      Provider Location (City/State):     [x] [place] [facility]   [] Amy Lou         Date: 7/13/20    Start Time: 8:30 AM    End Time: 10:00 AM    Number of Participants: 6    Type of Group: Psychotherapy    Patient's Goal:  To increase socialization and improve interpersonal relationships. Notes: Themes of group focused on coping with stressors. Pt easily engaged in group. Pt shared recent stressors and identified feelings of frustration. Pt was able to relate to other group members that shared. Pt provided support and feedback to others. Pt identified what has been helpful to her in order to cope with uncomfortable situations. Status After Intervention:  Improved    Participation Level:  Active Listener and Interactive    Participation Quality: Appropriate, Attentive, and Sharing    Speech:  normal    Thought Process/Content: Logical  Linear    Affective Functioning: Congruent    Mood: euthymic    Level of consciousness:  Alert, Oriented x4, and Attentive    Response to Learning: Able to verbalize current knowledge/experience, Able to verbalize/acknowledge new learning, and Able to retain information    Endings: None Reported    Modes of Intervention: Support, Socialization, and Exploration    Discipline Responsible: /Counselor

## 2020-07-16 ENCOUNTER — HOSPITAL ENCOUNTER (OUTPATIENT)
Dept: PSYCHIATRY | Age: 70
Setting detail: THERAPIES SERIES
Discharge: HOME | End: 2020-07-16
Payer: MEDICARE

## 2020-07-16 PROCEDURE — 90853 GROUP PSYCHOTHERAPY: CPT

## 2020-07-16 NOTE — PLAN OF CARE
This virtual group therapy visit was conducted via:     [] Telephone    [x] Video      Patient Location:     [x] Patient's Home  [] Other:      Provider Location (City/State):     [x] [place], [place]   [] Christin Ruiz         Date: 7/16/20    Start Time: 10:15 AM    End Time: 11:45 AM    Number of Participants: 4    Type of Group: Recovery    Patient's Goal: To explore behaviors and emotions related to belongingness. Additionally, identify support persons and ways pt can maintain support system. Notes: Pt was actively engaged in discussion and activity. Pt shared having many support persons in her life. Pt identified barriers and expressed frustration related to this. Status After Intervention:  Improved    Participation Level:  Active Listener and Interactive    Participation Quality: Appropriate, Attentive, and Sharing    Speech:  normal    Thought Process/Content: Logical  Linear    Affective Functioning: Congruent    Mood: euthymic    Level of consciousness:  Alert, Oriented x4, and Attentive    Response to Learning: Able to verbalize current knowledge/experience, Able to verbalize/acknowledge new learning, and Able to retain information    Endings: None Reported    Modes of Intervention: Education, Support, Socialization, Exploration, Clarifying, Problem-solving, and Activity    Discipline Responsible: /Counselor

## 2020-07-16 NOTE — PLAN OF CARE
This virtual group therapy visit was conducted via:     [] Telephone    [x] Video      Patient Location:     [x] Patient's Home  [] Other:      Provider Location (City/State):     [x] [place], [facility]   [] [address]       Date: 7/16    Start Time: 8:30    End Time:  10:00    Number of Participants: 4    Type of Group: Psychotherapy    Patient's Goal: To increase socialization and improve interpersonal relationships. Notes: Themes of group focused on increasing motivation and pushing oneself to complete daily tasks. Pt easily engaged in group and participated in group discussion. Pt offered support and feedback to group members. Pt related to themes discussed. Pt stated this would be her last group session. Pt stated she felt \"ready and prepared\" to move forward. Status After Intervention:  Unchanged    Participation Level:  Active Listener and Interactive    Participation Quality: Appropriate, Attentive, and Sharing    Speech:  normal    Thought Process/Content: Logical  Linear    Affective Functioning: Congruent    Mood: euthymic    Level of consciousness:  Alert and Oriented x4    Response to Learning: Able to verbalize current knowledge/experience, Able to verbalize/acknowledge new learning, and Able to retain information    Endings: None Reported    Modes of Intervention: Support, Socialization, and Exploration    Discipline Responsible: /Counselor

## 2020-07-20 ENCOUNTER — HOSPITAL ENCOUNTER (OUTPATIENT)
Dept: PSYCHIATRY | Age: 70
Setting detail: THERAPIES SERIES
Discharge: HOME | End: 2020-07-20
Payer: MEDICARE

## 2020-07-20 ENCOUNTER — HOSPITAL ENCOUNTER (OUTPATIENT)
Dept: PSYCHIATRY | Age: 70
Setting detail: THERAPIES SERIES
End: 2020-07-20
Payer: MEDICARE

## 2020-07-20 PROCEDURE — 99441 PR PHYS/QHP TELEPHONE EVALUATION 5-10 MIN: CPT | Performed by: PSYCHIATRY & NEUROLOGY

## 2020-07-20 NOTE — BH NOTE
PSYCHIATRY ATTENDING NOTE    CC: \"Everything is good. \"    S: Patient being seen at [facility] in follow-up for depression and anxiety. No medication changes made last appointment. Spoke with patient on the phone today due to coronavirus precautions which she consented to. Patient location home. Provider location Alma Price. Jacques Urbano reports she is doing well and would like to be discharged. She has scheduled follow-up in place at [facility]. Mood stable. Anxiety well-controlled. Sleeping well with Remeron. Tolerating medications without incident. No acute issues. MSE: Pleasant, cooperative, forthcoming. Mood euthymic. Speech clear. Thoughts organized. Content future-oriented. No SI, HI, paranoia, delusions, hallucinations. Orientation, concentration, recent and remote memory are grossly intact. Fund of knowledge fair. Language use fair. Insight and judgment fair. MEDICATIONS:  Paxil 20 mg nightly (cont)                                      Remeron 15 mg (cont)    ASSESSMENT:  MDD recurrent moderate                                      SHANNA    PLAN: Discharge from [facility].     Total time spent 7 min                          Electronically signed by Rad Jameson MD on 7/20/2020 at 12:21 PM

## 2020-07-21 ENCOUNTER — APPOINTMENT (OUTPATIENT)
Dept: PSYCHIATRY | Age: 70
End: 2020-07-21
Payer: MEDICARE

## 2020-07-23 ENCOUNTER — APPOINTMENT (OUTPATIENT)
Dept: PSYCHIATRY | Age: 70
End: 2020-07-23
Payer: MEDICARE

## 2020-07-27 ENCOUNTER — APPOINTMENT (OUTPATIENT)
Dept: PSYCHIATRY | Age: 70
End: 2020-07-27
Payer: MEDICARE

## 2020-07-28 ENCOUNTER — APPOINTMENT (OUTPATIENT)
Dept: PSYCHIATRY | Age: 70
End: 2020-07-28
Payer: MEDICARE

## 2020-07-30 ENCOUNTER — APPOINTMENT (OUTPATIENT)
Dept: PSYCHIATRY | Age: 70
End: 2020-07-30
Payer: MEDICARE

## 2021-04-01 ENCOUNTER — HOSPITAL ENCOUNTER (OUTPATIENT)
Dept: GENERAL RADIOLOGY | Age: 71
Discharge: HOME | End: 2021-04-03
Payer: MEDICARE

## 2021-04-01 ENCOUNTER — HOSPITAL ENCOUNTER (OUTPATIENT)
Age: 71
Discharge: HOME | End: 2021-04-03
Payer: MEDICARE

## 2021-04-01 DIAGNOSIS — M53.0 CERVICOCRANIAL SYNDROME: ICD-10-CM

## 2021-04-01 DIAGNOSIS — M54.42 LEFT-SIDED LOW BACK PAIN WITH SCIATICA, SCIATICA LATERALITY UNSPECIFIED, UNSPECIFIED CHRONICITY: ICD-10-CM

## 2021-04-01 PROCEDURE — 72040 X-RAY EXAM NECK SPINE 2-3 VW: CPT

## 2021-04-01 PROCEDURE — 72114 X-RAY EXAM L-S SPINE BENDING: CPT

## 2021-06-01 ENCOUNTER — HOSPITAL ENCOUNTER (OUTPATIENT)
Dept: NEUROLOGY | Age: 71
Discharge: HOME | End: 2021-06-01
Payer: MEDICARE

## 2021-06-01 VITALS — HEIGHT: 61 IN | WEIGHT: 187 LBS | BODY MASS INDEX: 35.3 KG/M2

## 2021-06-01 PROCEDURE — 95912 NRV CNDJ TEST 11-12 STUDIES: CPT

## 2021-06-01 PROCEDURE — 95886 MUSC TEST DONE W/N TEST COMP: CPT

## 2021-06-01 NOTE — PROCEDURES
5.05 11.3 15 2.60 58 33.5 100      A. Elbow 6.35 11.0 10 1.30 77 33.6 97.3       Sensory NCS      Nerve / Sites Onset Lat Peak Lat PP Amp Distance Velocity Temp.    ms ms µV cm m/s °C   R Median - Digit II (Antidromic)      Mid Palm 1.51 2.19 24.7 7 46 33.6      Wrist 3.54 4.43 20.7 14 40 33.6   L Median - Digit II (Antidromic)      Mid Palm 1.09 1.93 19.5 7 64 33.1      Wrist 3.91 5.21 13.2 14 36 33.1   R Ulnar - Digit V (Antidromic)      Wrist 2.45 3.02 28.0 14 57 33.5   L Ulnar - Digit V (Antidromic)      Wrist 2.24 3.02 22.5 14 63 33.1   R Radial - Anatomical snuff box (Forearm)      Forearm 1.56 2.08 23.4 10 64 33.6   L Radial - Anatomical snuff box (Forearm)      Forearm 1.67 2.14 21.2 10 60 33.3       Combined Sensory Index      Nerve / Sites Rec. Site Peak Lat NP Amp PP Amp Segments Peak Diff Temp. ms µV µV  ms °C   R Median - CSI      Median Thumb 3.49 15.6 16.3 Median - Radial 1.46 33.5      Radial Thumb 2.03 10.5 12.5 Median - Ulnar        CSI     CSI 1.46          F  Wave      Nerve F Lat M Lat F-M Lat    ms ms ms   R Median - APB 26.4 3.8 22.7   R Ulnar - ADM 24.2 2.6 21.6   L Median - APB 27.9 5.7 22.2   L Ulnar - ADM 25.3 2.6 22.8       EMG         EMG Summary Table     Spontaneous MUAP Recruitment   Muscle IA Fib PSW Fasc H.F. Amp Dur. PPP Pattern   L. Abductor pollicis brevis N None None None None N N N N   L. First dorsal interosseous N None None None None N N N N   L. Brachioradialis N None None None None N N N N   L. Biceps brachii N None None None None N N N N   L. Deltoid N None None None None N N N N   L. Triceps brachii N None None None None N N N N   L. Cervical paraspinals N None None None None N N 1+ N                 ·     Needle EMG:   · Needle EMG was performed using a monopolar needle.   · The following abnormalities were seen on needle EMG: a few polyphasic potentials lower cervical paraspinal muscles  All other muscles tested, as listed in the table above demonstrated normal

## 2021-06-22 ENCOUNTER — HOSPITAL ENCOUNTER (OUTPATIENT)
Dept: GENERAL RADIOLOGY | Age: 71
Discharge: HOME | End: 2021-06-24
Payer: MEDICARE

## 2021-06-22 DIAGNOSIS — M47.812 CERVICAL SPONDYLOSIS: ICD-10-CM

## 2021-06-22 DIAGNOSIS — R52 PAIN: ICD-10-CM

## 2021-06-22 DIAGNOSIS — M50.20 CERVICAL HERNIATED DISC: ICD-10-CM

## 2021-06-22 PROCEDURE — 2709999900 FL GUIDED FOR SPINE INJECT

## 2021-06-22 PROCEDURE — 77002 NEEDLE LOCALIZATION BY XRAY: CPT

## 2021-06-22 PROCEDURE — 64490 INJ PARAVERT F JNT C/T 1 LEV: CPT

## 2021-07-29 ENCOUNTER — HOSPITAL ENCOUNTER (EMERGENCY)
Age: 71
Discharge: HOME | End: 2021-07-29
Attending: EMERGENCY MEDICINE
Payer: MEDICARE

## 2021-07-29 VITALS
WEIGHT: 180 LBS | SYSTOLIC BLOOD PRESSURE: 125 MMHG | BODY MASS INDEX: 33.99 KG/M2 | HEIGHT: 61 IN | HEART RATE: 70 BPM | TEMPERATURE: 97.5 F | DIASTOLIC BLOOD PRESSURE: 89 MMHG | OXYGEN SATURATION: 97 % | RESPIRATION RATE: 16 BRPM

## 2021-07-29 DIAGNOSIS — F32.A DEPRESSION, UNSPECIFIED DEPRESSION TYPE: Primary | ICD-10-CM

## 2021-07-29 DIAGNOSIS — Z76.0 MEDICATION REFILL: ICD-10-CM

## 2021-07-29 PROCEDURE — 99283 EMERGENCY DEPT VISIT LOW MDM: CPT

## 2021-07-29 RX ORDER — TOPIRAMATE 25 MG/1
50 TABLET ORAL NIGHTLY
COMMUNITY

## 2021-07-29 RX ORDER — PAROXETINE HYDROCHLORIDE 20 MG/1
20 TABLET, FILM COATED ORAL DAILY
Qty: 30 TABLET | Refills: 0 | Status: SHIPPED | OUTPATIENT
Start: 2021-07-29

## 2021-07-29 NOTE — ED PROVIDER NOTES
HPI:  7/29/21, Time: 11:19 AM EDT         Susanna Benitez is a 73 y.o. female presenting to the ED for refill of her Paxil, beginning today. Patient states she's been out of it for a couple weeks and has been tapering it down from 30mg over the past 2 months to try to come off of it. She states the past couple days her anxiety has been increased due to lots of housework going on and her depression has increased with crying spells at home. She feels like she needs to go back on the Paxil. She called the psychiatry office and they will not refill it and cannot see her until the 2nd week of August.  She denies suicidal ideation, homicidal ideation, inability to function due to her depression or hallucinations. She is simply here to restart her Paxil which would be 20 mg daily. The complaint has been persistant, moderate in severity, and worsened by nothing. Patient denies fever/chills, sore throat, cough, congestion, chest pain, shortness of breath, edema, headache, visual disturbances, focal paresthesias, focal weakness, abdominal pain, nausea, vomiting, diarrhea, constipation, dysuria, hematuria, trauma, neck or back pain, rash or other complaints. ROS:   A complete review of systems was performed and all pertinent positives and negatives are stated within HPI, all other systems reviewed and are negative.      --------------------------------------------- PAST HISTORY ---------------------------------------------  Past Medical History:  has a past medical history of Allergic rhinitis, Anxiety, Dyslexia, GERD (gastroesophageal reflux disease), Hyperlipidemia, Hypertension, [facility] Osteoporosis, and Sleep disorder. Past Surgical History:  has a past surgical history that includes [place] tooth extraction; Tonsillectomy and adenoidectomy; colectomy; Tubal ligation; and pr egd transoral biopsy single/multiple (N/A, 9/26/2018).     Social History:  reports that she quit smoking about 3 years ago. Her smoking use included cigarettes. She started smoking about 50 years ago. She has a 43.00 pack-year smoking history. She has never used smokeless tobacco. She reports current alcohol use. She reports that she does not use drugs. Family History: family history includes Alzheimer's Disease in her mother; Cancer in her father; Diabetes in her mother. The patients home medications have been reviewed. Allergies: Tetracyclines & related, Z-ana paula [azithromycin], and Bee venom        ----------------------------------------PHYSICAL EXAM--------------------------------------  Constitutional:  Well developed, well nourished, no acute distress, non-toxic appearance   Eyes:  PERRL, conjunctiva normal, EOMI  HENT:  Atraumatic, external ears normal, nose normal, oropharynx moist. Neck- normal range of motion, no nuchal rigidity   Respiratory:  No respiratory distress, normal breath sounds, no rales, no wheezing   Cardiovascular:  Normal rate, normal rhythm, no murmurs, no gallops, no rubs. Radial pulses 2+ bilaterally. GI:  Soft, nondistended, normal bowel sounds, nontender, no organomegaly, no mass, no rebound, no guarding   :  No costovertebral angle tenderness   Musculoskeletal:  No edema, no tenderness, no deformities. Back- no tenderness  Integument:  Well hydrated, no visible rash. Adequate perfusion. Lymphatic:  No cervical lymphadenopathy noted   Neurologic:  Alert & oriented x 3, CN 2-12 normal, no focal deficits noted. Normal gait with walker. Psychiatric:    General: She is cooperative. Mood: depressed. Affect: appropriate. Thought Process: intact/within normal limits. Suicidal Thoughts: none   Homicidal Thoughts: none   Hallucinations: none         -------------------------------------------------- RESULTS -------------------------------------------------  I have personally reviewed all laboratory and imaging results for this patient. Results are listed below. LABS:  No results found for this visit on 07/29/21. RADIOLOGY:  Interpreted by Radiologist.  No orders to display       ------------------------- NURSING NOTES AND VITALS REVIEWED ---------------------------  The nursing notes within the ED encounter and vital signs as below have been reviewed by myself. /89   Pulse 70   Temp 97.5 °F (36.4 °C)   Resp 16   Ht 5' 1\" (1.549 m)   Wt 180 lb (81.6 kg)   SpO2 97%   BMI 34.01 kg/m²   Oxygen Saturation Interpretation: Normal      The patients available past medical records and past encounters were reviewed. ------------------------------ ED COURSE/MEDICAL DECISION MAKING----------------------  Medications - No data to display        Procedures:   none      Medical Decision Making:    Rx for Paxil 20mg 30 days supply. She is stable for continued outpatient management. She poses no threat to herself or others at this time. Patient was explicitly instructed on specific signs and symptoms on which to return to the emergency room for. Patient was instructed to return to the ER for any new or worsening symptoms. Additional discharge instructions were given verbally. All questions were answered. Patient is comfortable and agreeable with discharge plan. Patient in no acute distress and non-toxic in appearance. This patient's ED course included: a personal history and physicial eaxmination    This patient has remained hemodynamically stable during their ED course. Consultations:   Social work    Critical Care: none    Jason SOSA, DO, am the Primary Provider of Record    Counseling: The emergency provider has spoken with the patient and son and discussed todays results, in addition to providing specific details for the plan of care and counseling regarding the diagnosis and prognosis.   Questions are answered at this time and they are agreeable with the plan.    --------------------------- IMPRESSION AND DISPOSITION ---------------------------------    IMPRESSION  1. Depression, unspecified depression type    2.  Medication refill        DISPOSITION  Disposition: Discharge to home  Patient condition is stable             Traci SheltonDO  07/29/21 1154

## 2021-07-29 NOTE — ED NOTES
Emergency Department [facility] AN AFFILIATE OF [facility] Biopsychosocial Assessment Note    Chief Complaint:     Patient is a 73year old, female presenting to ED for medication refill. Patient reports she is experienced increased symptoms of depression since weaning herself off of Paxil over the last 2 months. Patient reports she was originally prescribed 30 mg and obtained consent from her NP at [facility] to wean herself. Patient reports she took 20 mg for 1 month and 10 mg for 1 month. Her last dose was 3 weeks ago. Patient reports that her symptoms have exacerbated and she has been having sporadic crying spells. Patient reports that she was told that she could call her NP and get her prescription quickly if she decided not to stay off of it, however patient reports that the NP told her she would have to wait until her appointment on 8/9. Patient reports that she spoke her NP today during a crying spell and the NP told her to go to the ED. Patient denies suicidal or homicidal ideations. MSE: Patient is alert & oriented x 4. Patient mood is in control, congruent affect. Patient thought process/speech are clear. Patient denies A/V hallucinations. Clinical Summary/History:     Patient reports a mental health hx of anxiety & depression; in current treatment with [facility] for medication management and counseling; and patient denies any hx of psychiatric hospitalizations. Patient reports decreased sleep, ok appetite, and patient denies hopelessness/helplessness. Patient denies hx of suicide attempts or self injurious behaviors. Patient denies drug/alcohol use. Gender  [x] Male [] Female [] Transgender  [] Other    Sexual Orientation    [] Heterosexual [] Homosexual [] Bisexual [] Other    N/A    Suicidal Behavioral: CSSR-S Complete.   [] Reports:    [] Past [] Present   [x] Denies    Homicidal/ Violent Behavior  [] Reports:   [] Past [] Present   [x] Denies     Hallucinations/Delusions   [] Reports: [x] Denies     Substance Use/Alcohol Use/Addiction: SBIRT Screen Complete. [] Reports:   [x] Denies     Trauma History  [] Reports:  [x] Denies     Collateral Information:       Level of Care/Disposition Plan  [x] Home:   [x] Outpatient Provider:   [] Crisis Unit:   [] Inpatient Psychiatric Unit:  [] Other:     Patient is not currently presenting with any acute psychiatric symptoms that would warrant an inpatient admission. Plan is for patient to discharge home with continued outpatient follow up. Discussed case with Dr. Marcos Boucher who is agreeable to provide temporary script for discharge.        Marina Merlin, MSW, [place]  07/29/21 [id number]

## 2021-09-07 ENCOUNTER — HOSPITAL ENCOUNTER (OUTPATIENT)
Dept: GENERAL RADIOLOGY | Age: 71
Discharge: HOME | End: 2021-09-09
Payer: MEDICARE

## 2021-09-07 DIAGNOSIS — M50.20 CERVICAL HERNIATED DISC: ICD-10-CM

## 2021-09-07 DIAGNOSIS — R52 PAIN: ICD-10-CM

## 2021-09-07 DIAGNOSIS — M47.812 CERVICAL SPONDYLOSIS: ICD-10-CM

## 2021-09-07 PROCEDURE — 64490 INJ PARAVERT F JNT C/T 1 LEV: CPT

## 2021-09-07 PROCEDURE — 64483 NJX AA&/STRD TFRM EPI L/S 1: CPT

## 2022-06-16 ENCOUNTER — APPOINTMENT (OUTPATIENT)
Dept: GENERAL RADIOLOGY | Age: 72
DRG: 177 | End: 2022-06-16
Payer: MEDICARE

## 2022-06-16 ENCOUNTER — HOSPITAL ENCOUNTER (INPATIENT)
Age: 72
LOS: 1 days | Discharge: HOME | DRG: 177 | End: 2022-06-17
Attending: EMERGENCY MEDICINE | Admitting: INTERNAL MEDICINE
Payer: MEDICARE

## 2022-06-16 DIAGNOSIS — R09.02 HYPOXIA: ICD-10-CM

## 2022-06-16 DIAGNOSIS — U07.1 COVID-19: ICD-10-CM

## 2022-06-16 DIAGNOSIS — J18.9 PNEUMONIA DUE TO INFECTIOUS ORGANISM, UNSPECIFIED LATERALITY, UNSPECIFIED PART OF LUNG: Primary | ICD-10-CM

## 2022-06-16 LAB
ALBUMIN SERPL-MCNC: 4 G/DL (ref 3.5–5.2)
ALP BLD-CCNC: 68 U/L (ref 35–104)
ALT SERPL-CCNC: 10 U/L (ref 0–32)
ANION GAP SERPL CALCULATED.3IONS-SCNC: 13 MMOL/L (ref 7–16)
AST SERPL-CCNC: 18 U/L (ref 0–31)
BASOPHILS ABSOLUTE: 0.04 E9/L (ref 0–0.2)
BASOPHILS RELATIVE PERCENT: 0.7 % (ref 0–2)
BILIRUB SERPL-MCNC: 0.4 MG/DL (ref 0–1.2)
BUN BLDV-MCNC: 19 MG/DL (ref 6–23)
CALCIUM SERPL-MCNC: 9 MG/DL (ref 8.6–10.2)
CHLORIDE BLD-SCNC: 101 MMOL/L (ref 98–107)
CO2: 22 MMOL/L (ref 22–29)
CREAT SERPL-MCNC: 1.3 MG/DL (ref 0.5–1)
EOSINOPHILS ABSOLUTE: 0.15 E9/L (ref 0.05–0.5)
EOSINOPHILS RELATIVE PERCENT: 2.5 % (ref 0–6)
GFR AFRICAN AMERICAN: 49
GFR NON-AFRICAN AMERICAN: 41 ML/MIN/1.73
GLUCOSE BLD-MCNC: 94 MG/DL (ref 74–99)
HCT VFR BLD CALC: 36.7 % (ref 34–48)
HEMOGLOBIN: 12.3 G/DL (ref 11.5–15.5)
IMMATURE GRANULOCYTES #: 0.02 E9/L
IMMATURE GRANULOCYTES %: 0.3 % (ref 0–5)
LACTIC ACID, SEPSIS: 0.9 MMOL/L (ref 0.5–1.9)
LYMPHOCYTES ABSOLUTE: 0.78 E9/L (ref 1.5–4)
LYMPHOCYTES RELATIVE PERCENT: 13 % (ref 20–42)
MCH RBC QN AUTO: 31.5 PG (ref 26–35)
MCHC RBC AUTO-ENTMCNC: 33.5 % (ref 32–34.5)
MCV RBC AUTO: 94.1 FL (ref 80–99.9)
MONOCYTES ABSOLUTE: 0.66 E9/L (ref 0.1–0.95)
MONOCYTES RELATIVE PERCENT: 11 % (ref 2–12)
NEUTROPHILS ABSOLUTE: 4.35 E9/L (ref 1.8–7.3)
NEUTROPHILS RELATIVE PERCENT: 72.5 % (ref 43–80)
PDW BLD-RTO: 14.1 FL (ref 11.5–15)
PLATELET # BLD: 227 E9/L (ref 130–450)
PMV BLD AUTO: 9.4 FL (ref 7–12)
POTASSIUM REFLEX MAGNESIUM: 3.7 MMOL/L (ref 3.5–5)
RBC # BLD: 3.9 E12/L (ref 3.5–5.5)
SODIUM BLD-SCNC: 136 MMOL/L (ref 132–146)
TOTAL PROTEIN: 7.3 G/DL (ref 6.4–8.3)
WBC # BLD: 6 E9/L (ref 4.5–11.5)

## 2022-06-16 PROCEDURE — 83615 LACTATE (LD) (LDH) ENZYME: CPT

## 2022-06-16 PROCEDURE — 6370000000 HC RX 637 (ALT 250 FOR IP): Performed by: EMERGENCY MEDICINE

## 2022-06-16 PROCEDURE — 85025 COMPLETE CBC W/AUTO DIFF WBC: CPT

## 2022-06-16 PROCEDURE — 86140 C-REACTIVE PROTEIN: CPT

## 2022-06-16 PROCEDURE — 87502 INFLUENZA DNA AMP PROBE: CPT

## 2022-06-16 PROCEDURE — 80053 COMPREHEN METABOLIC PANEL: CPT

## 2022-06-16 PROCEDURE — 82728 ASSAY OF FERRITIN: CPT

## 2022-06-16 PROCEDURE — 99285 EMERGENCY DEPT VISIT HI MDM: CPT

## 2022-06-16 PROCEDURE — 87635 SARS-COV-2 COVID-19 AMP PRB: CPT

## 2022-06-16 PROCEDURE — 2580000003 HC RX 258: Performed by: EMERGENCY MEDICINE

## 2022-06-16 PROCEDURE — 87040 BLOOD CULTURE FOR BACTERIA: CPT

## 2022-06-16 PROCEDURE — 83605 ASSAY OF LACTIC ACID: CPT

## 2022-06-16 PROCEDURE — 71046 X-RAY EXAM CHEST 2 VIEWS: CPT

## 2022-06-16 RX ORDER — 0.9 % SODIUM CHLORIDE 0.9 %
1000 INTRAVENOUS SOLUTION INTRAVENOUS ONCE
Status: COMPLETED | OUTPATIENT
Start: 2022-06-16 | End: 2022-06-17

## 2022-06-16 RX ORDER — ACETAMINOPHEN 325 MG/1
650 TABLET ORAL ONCE
Status: COMPLETED | OUTPATIENT
Start: 2022-06-16 | End: 2022-06-16

## 2022-06-16 RX ADMIN — ACETAMINOPHEN 650 MG: 325 TABLET ORAL at 23:49

## 2022-06-16 RX ADMIN — SODIUM CHLORIDE 1000 ML: 9 INJECTION, SOLUTION INTRAVENOUS at 23:55

## 2022-06-16 NOTE — Clinical Note
Discharge Plan[de-identified] Other/[facility])   Telemetry/Cardiac Monitoring Required?: Yes

## 2022-06-17 ENCOUNTER — APPOINTMENT (OUTPATIENT)
Dept: CT IMAGING | Age: 72
DRG: 177 | End: 2022-06-17
Payer: MEDICARE

## 2022-06-17 VITALS
WEIGHT: 172.6 LBS | DIASTOLIC BLOOD PRESSURE: 101 MMHG | OXYGEN SATURATION: 98 % | HEART RATE: 105 BPM | TEMPERATURE: 98 F | RESPIRATION RATE: 18 BRPM | BODY MASS INDEX: 32.59 KG/M2 | SYSTOLIC BLOOD PRESSURE: 160 MMHG | HEIGHT: 61 IN

## 2022-06-17 PROBLEM — R79.89 ELEVATED SERUM CREATININE: Status: ACTIVE | Noted: 2022-06-17

## 2022-06-17 PROBLEM — J96.01 ACUTE RESPIRATORY FAILURE WITH HYPOXIA (HCC): Status: ACTIVE | Noted: 2022-06-17

## 2022-06-17 PROBLEM — U07.1 COVID-19: Status: ACTIVE | Noted: 2022-06-17

## 2022-06-17 PROBLEM — F32.A DEPRESSION: Status: ACTIVE | Noted: 2022-06-17

## 2022-06-17 LAB
ANION GAP SERPL CALCULATED.3IONS-SCNC: 12 MMOL/L (ref 7–16)
BACTERIA: ABNORMAL /HPF
BASOPHILS ABSOLUTE: 0.02 E9/L (ref 0–0.2)
BASOPHILS RELATIVE PERCENT: 0.5 % (ref 0–2)
BILIRUBIN URINE: NEGATIVE
BLOOD, URINE: ABNORMAL
BUN BLDV-MCNC: 16 MG/DL (ref 6–23)
C-REACTIVE PROTEIN: 2.6 MG/DL (ref 0–0.4)
CALCIUM SERPL-MCNC: 8.2 MG/DL (ref 8.6–10.2)
CHLORIDE BLD-SCNC: 105 MMOL/L (ref 98–107)
CLARITY: CLEAR
CO2: 19 MMOL/L (ref 22–29)
COLOR: YELLOW
CREAT SERPL-MCNC: 1.1 MG/DL (ref 0.5–1)
D DIMER: 318 NG/ML DDU
EOSINOPHILS ABSOLUTE: 0.07 E9/L (ref 0.05–0.5)
EOSINOPHILS RELATIVE PERCENT: 1.6 % (ref 0–6)
FERRITIN: 146 NG/ML
FIBRINOGEN: 339 MG/DL (ref 200–400)
GFR AFRICAN AMERICAN: 59
GFR NON-AFRICAN AMERICAN: 49 ML/MIN/1.73
GLUCOSE BLD-MCNC: 106 MG/DL (ref 74–99)
GLUCOSE URINE: NEGATIVE MG/DL
HCT VFR BLD CALC: 34.7 % (ref 34–48)
HEMOGLOBIN: 11.4 G/DL (ref 11.5–15.5)
IMMATURE GRANULOCYTES #: 0.03 E9/L
IMMATURE GRANULOCYTES %: 0.7 % (ref 0–5)
INFLUENZA A BY PCR: NOT DETECTED
INFLUENZA B BY PCR: NOT DETECTED
KETONES, URINE: NEGATIVE MG/DL
LACTATE DEHYDROGENASE: 229 U/L (ref 135–214)
LEUKOCYTE ESTERASE, URINE: ABNORMAL
LYMPHOCYTES ABSOLUTE: 0.37 E9/L (ref 1.5–4)
LYMPHOCYTES RELATIVE PERCENT: 8.7 % (ref 20–42)
MCH RBC QN AUTO: 31.1 PG (ref 26–35)
MCHC RBC AUTO-ENTMCNC: 32.9 % (ref 32–34.5)
MCV RBC AUTO: 94.8 FL (ref 80–99.9)
MONOCYTES ABSOLUTE: 0.22 E9/L (ref 0.1–0.95)
MONOCYTES RELATIVE PERCENT: 5.2 % (ref 2–12)
NEUTROPHILS ABSOLUTE: 3.55 E9/L (ref 1.8–7.3)
NEUTROPHILS RELATIVE PERCENT: 83.3 % (ref 43–80)
NITRITE, URINE: NEGATIVE
PDW BLD-RTO: 14.1 FL (ref 11.5–15)
PH UA: 6 (ref 5–9)
PLATELET # BLD: 192 E9/L (ref 130–450)
PMV BLD AUTO: 9.6 FL (ref 7–12)
POTASSIUM REFLEX MAGNESIUM: 3.8 MMOL/L (ref 3.5–5)
PROTEIN UA: NEGATIVE MG/DL
RBC # BLD: 3.66 E12/L (ref 3.5–5.5)
RBC # BLD: NORMAL 10*6/UL
RBC UA: ABNORMAL /HPF (ref 0–2)
SARS-COV-2, NAAT: DETECTED
SODIUM BLD-SCNC: 136 MMOL/L (ref 132–146)
SPECIFIC GRAVITY UA: 1.01 (ref 1–1.03)
UROBILINOGEN, URINE: 0.2 E.U./DL
WBC # BLD: 4.3 E9/L (ref 4.5–11.5)
WBC UA: ABNORMAL /HPF (ref 0–5)

## 2022-06-17 PROCEDURE — 85025 COMPLETE CBC W/AUTO DIFF WBC: CPT

## 2022-06-17 PROCEDURE — 6360000002 HC RX W HCPCS: Performed by: NURSE PRACTITIONER

## 2022-06-17 PROCEDURE — 2580000003 HC RX 258: Performed by: NURSE PRACTITIONER

## 2022-06-17 PROCEDURE — 85378 FIBRIN DEGRADE SEMIQUANT: CPT

## 2022-06-17 PROCEDURE — 6370000000 HC RX 637 (ALT 250 FOR IP): Performed by: NURSE PRACTITIONER

## 2022-06-17 PROCEDURE — 71275 CT ANGIOGRAPHY CHEST: CPT

## 2022-06-17 PROCEDURE — 80048 BASIC METABOLIC PNL TOTAL CA: CPT

## 2022-06-17 PROCEDURE — 6360000002 HC RX W HCPCS: Performed by: EMERGENCY MEDICINE

## 2022-06-17 PROCEDURE — 87077 CULTURE AEROBIC IDENTIFY: CPT

## 2022-06-17 PROCEDURE — 81001 URINALYSIS AUTO W/SCOPE: CPT

## 2022-06-17 PROCEDURE — 6370000000 HC RX 637 (ALT 250 FOR IP): Performed by: STUDENT IN AN ORGANIZED HEALTH CARE EDUCATION/TRAINING PROGRAM

## 2022-06-17 PROCEDURE — 87088 URINE BACTERIA CULTURE: CPT

## 2022-06-17 PROCEDURE — 87186 SC STD MICRODIL/AGAR DIL: CPT

## 2022-06-17 PROCEDURE — 36415 COLL VENOUS BLD VENIPUNCTURE: CPT

## 2022-06-17 PROCEDURE — 6360000004 HC RX CONTRAST MEDICATION: Performed by: RADIOLOGY

## 2022-06-17 PROCEDURE — 85384 FIBRINOGEN ACTIVITY: CPT

## 2022-06-17 PROCEDURE — 2060000000 HC ICU INTERMEDIATE R&B

## 2022-06-17 RX ORDER — GABAPENTIN 300 MG/1
300 CAPSULE ORAL 3 TIMES DAILY
Status: DISCONTINUED | OUTPATIENT
Start: 2022-06-17 | End: 2022-06-17

## 2022-06-17 RX ORDER — DEXAMETHASONE SODIUM PHOSPHATE 10 MG/ML
10 INJECTION INTRAMUSCULAR; INTRAVENOUS ONCE
Status: COMPLETED | OUTPATIENT
Start: 2022-06-17 | End: 2022-06-17

## 2022-06-17 RX ORDER — ACETAMINOPHEN 650 MG/1
650 SUPPOSITORY RECTAL EVERY 6 HOURS PRN
Status: DISCONTINUED | OUTPATIENT
Start: 2022-06-17 | End: 2022-06-17 | Stop reason: HOSPADM

## 2022-06-17 RX ORDER — PAROXETINE HYDROCHLORIDE 20 MG/1
20 TABLET, FILM COATED ORAL DAILY
Status: DISCONTINUED | OUTPATIENT
Start: 2022-06-17 | End: 2022-06-17

## 2022-06-17 RX ORDER — ACETAMINOPHEN 325 MG/1
650 TABLET ORAL EVERY 6 HOURS PRN
Status: DISCONTINUED | OUTPATIENT
Start: 2022-06-17 | End: 2022-06-17 | Stop reason: HOSPADM

## 2022-06-17 RX ORDER — ATORVASTATIN CALCIUM 20 MG/1
20 TABLET, FILM COATED ORAL NIGHTLY
Status: DISCONTINUED | OUTPATIENT
Start: 2022-06-17 | End: 2022-06-17

## 2022-06-17 RX ORDER — ALBUTEROL SULFATE 90 UG/1
2 AEROSOL, METERED RESPIRATORY (INHALATION) EVERY 6 HOURS
Qty: 18 G | Refills: 3 | Status: SHIPPED | OUTPATIENT
Start: 2022-06-17

## 2022-06-17 RX ORDER — SIMVASTATIN 40 MG
40 TABLET ORAL NIGHTLY
Status: DISCONTINUED | OUTPATIENT
Start: 2022-06-17 | End: 2022-06-17 | Stop reason: CLARIF

## 2022-06-17 RX ORDER — MORPHINE SULFATE 2 MG/ML
2 INJECTION, SOLUTION INTRAMUSCULAR; INTRAVENOUS ONCE
Status: COMPLETED | OUTPATIENT
Start: 2022-06-17 | End: 2022-06-17

## 2022-06-17 RX ORDER — SODIUM CHLORIDE 9 MG/ML
INJECTION, SOLUTION INTRAVENOUS PRN
Status: DISCONTINUED | OUTPATIENT
Start: 2022-06-17 | End: 2022-06-17 | Stop reason: HOSPADM

## 2022-06-17 RX ORDER — TOPIRAMATE 25 MG/1
50 TABLET ORAL NIGHTLY
Status: DISCONTINUED | OUTPATIENT
Start: 2022-06-17 | End: 2022-06-17 | Stop reason: HOSPADM

## 2022-06-17 RX ORDER — DEXAMETHASONE SODIUM PHOSPHATE 10 MG/ML
6 INJECTION, SOLUTION INTRAMUSCULAR; INTRAVENOUS DAILY
Status: DISCONTINUED | OUTPATIENT
Start: 2022-06-17 | End: 2022-06-17 | Stop reason: HOSPADM

## 2022-06-17 RX ORDER — ONDANSETRON 2 MG/ML
4 INJECTION INTRAMUSCULAR; INTRAVENOUS ONCE
Status: COMPLETED | OUTPATIENT
Start: 2022-06-17 | End: 2022-06-17

## 2022-06-17 RX ORDER — LEVOFLOXACIN 5 MG/ML
750 INJECTION, SOLUTION INTRAVENOUS ONCE
Status: COMPLETED | OUTPATIENT
Start: 2022-06-17 | End: 2022-06-17

## 2022-06-17 RX ORDER — ZINC SULFATE 50(220)MG
50 CAPSULE ORAL DAILY
Status: DISCONTINUED | OUTPATIENT
Start: 2022-06-17 | End: 2022-06-17 | Stop reason: HOSPADM

## 2022-06-17 RX ORDER — LOSARTAN POTASSIUM 25 MG/1
25 TABLET ORAL DAILY
COMMUNITY

## 2022-06-17 RX ORDER — SODIUM CHLORIDE 0.9 % (FLUSH) 0.9 %
5-40 SYRINGE (ML) INJECTION PRN
Status: DISCONTINUED | OUTPATIENT
Start: 2022-06-17 | End: 2022-06-17 | Stop reason: HOSPADM

## 2022-06-17 RX ORDER — ASCORBIC ACID 500 MG
500 TABLET ORAL DAILY
Status: DISCONTINUED | OUTPATIENT
Start: 2022-06-17 | End: 2022-06-17 | Stop reason: HOSPADM

## 2022-06-17 RX ORDER — GUAIFENESIN 100 MG/5ML
200 SOLUTION ORAL EVERY 6 HOURS PRN
Qty: 1200 ML | Refills: 0 | Status: SHIPPED | OUTPATIENT
Start: 2022-06-17

## 2022-06-17 RX ORDER — MIRTAZAPINE 15 MG/1
15 TABLET, FILM COATED ORAL NIGHTLY
Status: DISCONTINUED | OUTPATIENT
Start: 2022-06-17 | End: 2022-06-17

## 2022-06-17 RX ORDER — HEPARIN SODIUM 10000 [USP'U]/ML
5000 INJECTION, SOLUTION INTRAVENOUS; SUBCUTANEOUS EVERY 8 HOURS
Status: DISCONTINUED | OUTPATIENT
Start: 2022-06-17 | End: 2022-06-17 | Stop reason: HOSPADM

## 2022-06-17 RX ORDER — POLYETHYLENE GLYCOL 3350 17 G/17G
17 POWDER, FOR SOLUTION ORAL DAILY PRN
Status: DISCONTINUED | OUTPATIENT
Start: 2022-06-17 | End: 2022-06-17 | Stop reason: HOSPADM

## 2022-06-17 RX ORDER — GUAIFENESIN 100 MG/5ML
200 SOLUTION ORAL EVERY 6 HOURS PRN
Status: DISCONTINUED | OUTPATIENT
Start: 2022-06-17 | End: 2022-06-17 | Stop reason: HOSPADM

## 2022-06-17 RX ORDER — OXYCODONE HYDROCHLORIDE 5 MG/1
5 TABLET ORAL EVERY 4 HOURS PRN
Status: DISCONTINUED | OUTPATIENT
Start: 2022-06-17 | End: 2022-06-17 | Stop reason: HOSPADM

## 2022-06-17 RX ORDER — HYDRALAZINE HYDROCHLORIDE 20 MG/ML
5 INJECTION INTRAMUSCULAR; INTRAVENOUS EVERY 6 HOURS PRN
Status: DISCONTINUED | OUTPATIENT
Start: 2022-06-17 | End: 2022-06-17 | Stop reason: HOSPADM

## 2022-06-17 RX ORDER — HYDRALAZINE HYDROCHLORIDE 20 MG/ML
5 INJECTION INTRAMUSCULAR; INTRAVENOUS EVERY 6 HOURS PRN
Status: DISCONTINUED | OUTPATIENT
Start: 2022-06-17 | End: 2022-06-17

## 2022-06-17 RX ORDER — OXYCODONE HYDROCHLORIDE 5 MG/1
10 TABLET ORAL ONCE
Status: COMPLETED | OUTPATIENT
Start: 2022-06-17 | End: 2022-06-17

## 2022-06-17 RX ORDER — DEXAMETHASONE 6 MG/1
6 TABLET ORAL DAILY
Qty: 5 TABLET | Refills: 0 | Status: SHIPPED | OUTPATIENT
Start: 2022-06-17 | End: 2022-06-22

## 2022-06-17 RX ORDER — SODIUM CHLORIDE 0.9 % (FLUSH) 0.9 %
5-40 SYRINGE (ML) INJECTION EVERY 12 HOURS SCHEDULED
Status: DISCONTINUED | OUTPATIENT
Start: 2022-06-17 | End: 2022-06-17 | Stop reason: HOSPADM

## 2022-06-17 RX ORDER — ALBUTEROL SULFATE 90 UG/1
2 AEROSOL, METERED RESPIRATORY (INHALATION) EVERY 6 HOURS
Status: DISCONTINUED | OUTPATIENT
Start: 2022-06-17 | End: 2022-06-17 | Stop reason: HOSPADM

## 2022-06-17 RX ADMIN — ONDANSETRON 4 MG: 2 INJECTION INTRAMUSCULAR; INTRAVENOUS at 00:55

## 2022-06-17 RX ADMIN — PAROXETINE 30 MG: 10 TABLET, FILM COATED ORAL at 08:14

## 2022-06-17 RX ADMIN — OXYCODONE 10 MG: 5 TABLET ORAL at 11:40

## 2022-06-17 RX ADMIN — ACETAMINOPHEN 650 MG: 325 TABLET ORAL at 08:14

## 2022-06-17 RX ADMIN — DEXAMETHASONE SODIUM PHOSPHATE 10 MG: 10 INJECTION INTRAMUSCULAR; INTRAVENOUS at 00:54

## 2022-06-17 RX ADMIN — MORPHINE SULFATE 2 MG: 2 INJECTION, SOLUTION INTRAMUSCULAR; INTRAVENOUS at 00:55

## 2022-06-17 RX ADMIN — POLYETHYLENE GLYCOL 3350 17 G: 17 POWDER, FOR SOLUTION ORAL at 08:49

## 2022-06-17 RX ADMIN — SODIUM CHLORIDE, PRESERVATIVE FREE 10 ML: 5 INJECTION INTRAVENOUS at 08:14

## 2022-06-17 RX ADMIN — IOPAMIDOL 55 ML: 755 INJECTION, SOLUTION INTRAVENOUS at 01:05

## 2022-06-17 RX ADMIN — HEPARIN SODIUM 5000 UNITS: 10000 INJECTION INTRAVENOUS; SUBCUTANEOUS at 06:51

## 2022-06-17 RX ADMIN — LEVOFLOXACIN 750 MG: 750 INJECTION, SOLUTION INTRAVENOUS at 00:39

## 2022-06-17 RX ADMIN — HYDRALAZINE HYDROCHLORIDE 5 MG: 20 INJECTION INTRAMUSCULAR; INTRAVENOUS at 11:41

## 2022-06-17 RX ADMIN — OXYCODONE HYDROCHLORIDE AND ACETAMINOPHEN 500 MG: 500 TABLET ORAL at 08:14

## 2022-06-17 RX ADMIN — ZINC SULFATE 220 MG (50 MG) CAPSULE 50 MG: CAPSULE at 08:14

## 2022-06-17 ASSESSMENT — ENCOUNTER SYMPTOMS
NAUSEA: 0
ABDOMINAL PAIN: 0
FACIAL SWELLING: 0
SHORTNESS OF BREATH: 1
COLOR CHANGE: 0
VOMITING: 0
COUGH: 1
DIARRHEA: 0
ABDOMINAL DISTENTION: 0
CONSTIPATION: 0
TROUBLE SWALLOWING: 0

## 2022-06-17 ASSESSMENT — PAIN SCALES - GENERAL
PAINLEVEL_OUTOF10: 8
PAINLEVEL_OUTOF10: 7
PAINLEVEL_OUTOF10: 8

## 2022-06-17 ASSESSMENT — PAIN DESCRIPTION - LOCATION
LOCATION: ABDOMEN
LOCATION: ABDOMEN

## 2022-06-17 ASSESSMENT — PAIN DESCRIPTION - DESCRIPTORS: DESCRIPTORS: SHARP;SHOOTING

## 2022-06-17 NOTE — CONSULTS
GENERAL SURGERY  CONSULT NOTE  6/17/2022    Physician Consulted: Dr. Leslye Healy  Reason for Consult: recent hernia repair  Referring Physician: Dr. Boom Shen is a 73 y.o. female who presents to the general surgery service for evaluation of recent surgery. The patient underwent laparoscopic hernia repair on 6/15 with Dr. Leslye Healy. She is now admitted for treatment of COVID pneumonia. She is denying any symptoms of bowel obstruction or worsening post-operative pain. Past Medical History:   Diagnosis Date    Allergic rhinitis     Anxiety     Dyslexia     GERD (gastroesophageal reflux disease)     Hyperlipidemia     Hypertension     Multiple sclerosis ([facility] 1980    Osteoporosis     Sleep disorder        Past Surgical History:   Procedure Laterality Date    COLECTOMY      diverticulitis     HERNIA REPAIR      [place] EGD TRANSORAL BIOPSY SINGLE/MULTIPLE N/A 9/26/2018    EGD BIOPSY performed [facility] at [facility]      WISDOM TOOTH EXTRACTION         Medications Prior to Admission:    Prior to Admission medications    Medication Sig Start Date End Date Taking?  Authorizing Provider   losartan (COZAAR) 25 MG tablet Take 25 mg by mouth daily   Yes Historical Provider, MD   topiramate (TOPAMAX) 25 MG tablet Take 50 mg by mouth nightly    Historical Provider, MD   PARoxetine (PAXIL) 20 MG tablet Take 1 tablet by mouth daily  Patient taking differently: Take 30 mg by mouth daily  7/29/21   Jim Scott DO   mirtazapine (REMERON) 15 MG tablet Take 1 tablet by mouth nightly  Patient not taking: Reported on 6/17/2022 7/31/20   Ottoniel Silva MD   Handicap Leila MISC by Does not apply route Patient cannot walk 200 ft without stopping to rest.    Expiration *1/10/24 1/9/19   Nancey Crimes, APRN - CNP   diphenhydrAMINE (BENADRYL) 25 MG capsule Take 25 mg by mouth every 6 hours as needed for Itching    Historical Provider, MD   sodium chloride (OCEAN, BABY AYR) 0.65 % nasal spray 1 spray by Nasal route as needed for Congestion    Historical Provider, MD       Allergies   Allergen Reactions    Tetracyclines & Related Hives    Z-Anant [Azithromycin] Palpitations    Bee Venom        Family History   Problem Relation Age of Onset    Cancer Father         esophageal    Diabetes Mother     Alzheimer's Disease Mother        Social History     Tobacco Use    Smoking status: Former Smoker     Packs/day: 1.00     Years: 43.00     Pack years: 43.00     Types: Cigarettes     Start date: 1971     Quit date: 2018     Years since quittin.4    Smokeless tobacco: Never Used   Vaping Use    Vaping Use: Never used   Substance Use Topics    Alcohol use: Yes     Alcohol/week: 0.0 standard drinks     Comment: twice a year    Drug use: No         Review of Systems   Constitutional: Positive for chills, fatigue and fever. Negative for appetite change. HENT: Negative for facial swelling and trouble swallowing. Respiratory: Positive for cough and shortness of breath. Cardiovascular: Negative for chest pain and palpitations. Gastrointestinal: Negative for abdominal distention, abdominal pain, constipation, diarrhea, nausea and vomiting. Endocrine: Negative for polydipsia and polyphagia. Genitourinary: Negative for difficulty urinating and dysuria. Skin: Negative for color change and rash. Neurological: Negative for dizziness and weakness. Psychiatric/Behavioral: Negative for agitation, behavioral problems and confusion. PHYSICAL EXAM:    Vitals:    22 0800   BP: (!) 143/98   Pulse: 91   Resp: 18   Temp: 97.8 °F (36.6 °C)   SpO2: 98%       General Appearance:  awake, alert, oriented, in no acute distress  Skin:  Skin color, texture, turgor normal. No rashes or lesions. Head/face:  NCAT  Eyes:  No gross abnormalities. Sclera nonicteric  Lungs/Chest:  Normal expansion. No respiratory distress.  On room air. No chest wall tenderness  Heart: Warm throughout. Regular rate   Abdomen:  Soft, no tenderness, minimally distended. Incisions clean, dry, and intact   Extremities: Extremities warm to touch, pink, with no edema. LABS:    CBC  Recent Labs     06/17/22  0400   WBC 4.3*   HGB 11.4*   HCT 34.7        BMP  Recent Labs     06/17/22  0400      K 3.8      CO2 19*   BUN 16   CREATININE 1.1*   CALCIUM 8.2*     Liver Function  Recent Labs     06/16/22  2250   BILITOT 0.4   AST 18   ALT 10   ALKPHOS 68   PROT 7.3   LABALBU 4.0     No results for input(s): LACTATE in the last 72 hours. No results for input(s): INR, PTT in the last 72 hours. Invalid input(s): PT    RADIOLOGY    I have personally reviewed all relevant labs and imaging. No new abdominal imaging  No leukocytosis    ASSESSMENT:  73 y.o. female POD 2 from laparoscopic hernia repair.  Admitted for treatment of COVID pneumonia    PLAN:   No new interventions from general surgeru standpoint   Diet as tolerated   Okay for steroids if needed for treatment of COVID pneumonia, will defer to medicine team    Discussed with Dr. Mackey Double    Electronically signed by Leida Iqbal DO on 6/17/22 at 8:15 AM EDT

## 2022-06-17 NOTE — CARE COORDINATION
Social Work / Discharge Planning: COVID POSITIVE: SW spoke to patient and explained role as discharge planner/ transition of care. Patient verified plan at discharge is HOME. patient had recent hernia sx and had fevers. Patient on  RA. Patient has a walker but mainly uses a cane as needed and still drives short distances. Patient PCP is Leia Rice and she uses [facility] [address]. Patient daughter in law is also a nurse at [employer] and she has involved family. Patient denies any HOME needs. Plan at discharge is HOME. Await plan. SW to follow.  Electronically signed by EFREN Merino on 6/17/22 at 1:45 PM EDT

## 2022-06-17 NOTE — ED NOTES
Department of Emergency Medicine  FIRST PROVIDER TRIAGE NOTE             Independent MLP           6/16/22  9:21 PM EDT    Date of Encounter: 6/16/22   MRN: [id number]      HPI: Sunny Nieves is a 73 y.o. female who presents to the ED for Abdominal Pain (Rodrigo Candelaria did hernia surgery yesterday and pt has been running a fever since then so  sent pt in )    Patient just took ibuprofen prior to arrival but has had a fever since 6 PM today. She had hernia surgery yesterday. Patient also complaining of cough and body aches. ROS: Negative for cp or sob. PE: Gen Appearance/Constitutional: alert  Musculoskeletal: moves all extremities x 4     Initial Plan of Care: All treatment areas with department are currently occupied. Plan to order/Initiate the following while awaiting opening in ED: labs and imaging studies.   Initiate Treatment-Testing, Proceed toTreatment Area When Bed Available for ED Attending/MLP to Continue Care    Electronically signed by Taqueria Mon PA-C   DD: 6/16/22         Taqueria Mon PA-C  06/16/22 [id number]

## 2022-06-17 NOTE — ED NOTES
Ambulatory pulse ox conducted on pt. Pt's O2 saturation ranged between 89-88% on room air, Heart rate in the 100's. Pt denies any difficulty ambulating but displayed increased work of breathing.       Bhavesh Aguirre RN  06/17/22 0001

## 2022-06-17 NOTE — PROGRESS NOTES
Occupational Therapy  Date:6/17/2022  Patient Name: Walt Valdez  Room: 4876/4385-L     Occupational Therapy (OT) order received, patient's medical record reviewed, and OT evaluation attempted this afternoon; patient reported (via phone conversation with this OTR) that she is independent with ADLs and functional transfers/mobility (with use of rollator, as needed) and declined provision of OT services during this hospitalization. Patient indicated that she expects to be discharged home later this date and denied having concerns about returning home, particularly with regard to completion of ADLs and/or IADLs. Patient denied need for DME/AE for completion of ADLs in home environment. No OT evaluation completed, per patient preference. Maria Teresa Dodson, OTR/L  License Number: [id number]

## 2022-06-17 NOTE — H&P
[facility]  History and Physical      CHIEF COMPLAINT:    Chief Complaint   Patient presents with    Abdominal Pain      did hernia surgery yesterday and pt has been running a fever since then so  sent pt in         Patient of Drake Doty MD presents with:  Acute respiratory failure with hypoxia [facility])    History of Present Illness:   Patient is a 73year old female with a past medical history of anxiety, GERD, HLD, HTN, MS who presents to the emergency room for abdominal pain. Patient recently had hernia repair with Dr. Suzie Leo 6/15 as an outpatient was sent home. Patient complains of worsening pain and fever, although while here patient has been afebrile. Patient did incidentally test positive for COVID-19. Chest x-ray and CTA pulmonary were both obtained which was negative for anything acute. Lab work did show a slightly elevated D-dimer of 318 but given recent surgery no real significance. On assessment patient denies any CP, SOB, fever, chills, N/V/D. Patient is admitted to intermediate telemetry for further work-up and treatment. REVIEW OF SYSTEMS:  Pertinent negatives are above in HPI. 10 point ROS otherwise negative.       Past Medical History:   Diagnosis Date    Allergic rhinitis     Anxiety     Dyslexia     GERD (gastroesophageal reflux disease)     Hyperlipidemia     Hypertension     Multiple sclerosis ([facility] [address] 1980    Osteoporosis     Sleep disorder          Past Surgical History:   Procedure Laterality Date    COLECTOMY      diverticulitis     HERNIA REPAIR      [place] EGD TRANSORAL BIOPSY SINGLE/MULTIPLE N/A 9/26/2018    EGD BIOPSY performed by Niles Bustillos MD at [address] [place] [address]         Medications Prior to Admission:    Medications Prior to Admission: losartan (COZAAR) 25 MG tablet, Take 25 mg by mouth daily  topiramate (TOPAMAX) 25 MG tablet, Take 50 mg by mouth nightly  PARoxetine (PAXIL) 20 MG tablet, Take 1 tablet by mouth daily (Patient taking differently: Take 30 mg by mouth daily )  mirtazapine (REMERON) 15 MG tablet, Take 1 tablet by mouth nightly (Patient not taking: Reported on 6/17/2022)  [DISCONTINUED] gabapentin (NEURONTIN) 300 MG capsule, Take 1 capsule by mouth 3 times daily for 30 days. [DISCONTINUED] simvastatin (ZOCOR) 40 MG tablet, Take 1 tablet by mouth nightly  Handicap Placard MISC, by Does not apply route Patient cannot walk 200 ft without stopping to rest.   Expiration *1/10/24  diphenhydrAMINE (BENADRYL) 25 MG capsule, Take 25 mg by mouth every 6 hours as needed for Itching  sodium chloride (OCEAN, BABY AYR) 0.65 % nasal spray, 1 spray by Nasal route as needed for Congestion    Note that the patient's home medications were reviewed and the above list is accurate to the best of my knowledge at the time of the exam.    Allergies:    Tetracyclines & related, Z-ana paula [azithromycin], and Bee venom    Social History:    reports that she quit smoking about 4 years ago. Her smoking use included cigarettes. She started smoking about 51 years ago. She has a 43.00 pack-year smoking history. She has never used smokeless tobacco. She reports current alcohol use. She reports that she does not use drugs. Family History:   family history includes Alzheimer's Disease in her mother; Cancer in her father; Diabetes in her mother.       PHYSICAL EXAM:    Vitals:  BP (!) 196/104   Pulse 92   Temp 97.8 °F (36.6 °C) (Axillary)   Resp 18   Ht 5' 1\" (1.549 m)   Wt 172 lb 9.6 oz (78.3 kg)   SpO2 98%   BMI 32.61 kg/m²       General appearance: NAD, conversant  Eyes: Sclerae anicteric, PERRLA  HEENT: AT/NC, MMM  Neck: FROM, supple, no thyromegaly  Lymph: No cervical / supraclavicular lymphadenopathy  Lungs: Clear to auscultation, WOB normal  CV: RRR, no MRGs, no lower extremity edema  Abdomen: Soft, non-tender; no masses or HSM, +BS, surgical incision   Extremities: FROM without synovitis. No clubbing or cyanosis of the hands. Skin: no rash, induration, lesions, or ulcers  Psych: Calm and cooperative. Normal judgement and insight. Normal mood and affect. Neuro: Alert and interactive, face symmetric, speech fluent. LABS:  All labs reviewed. Of note:  CBC:   Lab Results   Component Value Date    WBC 4.3 06/17/2022    RBC 3.66 06/17/2022    HGB 11.4 06/17/2022    HCT 34.7 06/17/2022    MCV 94.8 06/17/2022    MCH 31.1 06/17/2022    MCHC 32.9 06/17/2022    RDW 14.1 06/17/2022     06/17/2022    MPV 9.6 06/17/2022     CMP:    Lab Results   Component Value Date     06/17/2022    K 3.8 06/17/2022     06/17/2022    CO2 19 06/17/2022    BUN 16 06/17/2022    CREATININE 1.1 06/17/2022    GFRAA 59 06/17/2022    LABGLOM 49 06/17/2022    GLUCOSE 106 06/17/2022    PROT 7.3 06/16/2022    LABALBU 4.0 06/16/2022    CALCIUM 8.2 06/17/2022    BILITOT 0.4 06/16/2022    ALKPHOS 68 06/16/2022    AST 18 06/16/2022    ALT 10 06/16/2022       Imaging:  CXR: negative  CTA Pulmonary: negative     EKG:  Not obtained    Telemetry:  NSR    ASSESSMENT/PLAN:  Principal Problem:    Acute respiratory failure with hypoxia (HCC)  Active Problems:    COVID-19    Depression    Elevated serum creatinine    Mixed hyperlipidemia  Resolved Problems:    * No resolved hospital problems. *    Patient is a 73year old female admitted to [facility] for   Covid-19  -Monitor labs  -CRP 2.6  -D-Dimer 339, likely elevated d/t recent surgery   -Decadron ordered, although not sure if needed.  -Currently on room air and 98%  -Ambulating pulse ox testing improved saturations with ambulation.   -Encourage incentive spirometry use. S/p Hernia Repair 6/15  -General surgery following, no further needs        Medication for other comorbidities continue as appropriate dose adjustment as necessary.     DVT prophylaxis Heparin   PT OT  Discharge planning home today   Case discussed with attending and agreed upon

## 2022-06-17 NOTE — PROGRESS NOTES
-Consulted to dose baricitinib vs remdesivir;  -Patient is not on oxygen, thus qualifies for neither.  -Quinones Ric for Dr. Ivonne Lisa notified.

## 2022-06-17 NOTE — ED PROVIDER NOTES
handling secretions,   Neck: Supple, full ROM,   Respiratory: Lungs clear to auscultation bilaterally, no wheezes, rales, or rhonchi. Not in respiratory distress  Cardiovascular:  Regular rate. Regular rhythm. No murmurs, gallops, or rubs. 2+ distal pulses  Chest: No chest wall tenderness  GI:  Abdomen Soft, incision c/d/i, appropriate post op pain, no peritoneal signs, Non distended. .   Musculoskeletal: Moves all extremities x 4. Warm and well perfused, no clubbing, cyanosis, or edema. Capillary refill <3 seconds  Integument: skin warm and dry. No rashes. Lymphatic: no lymphadenopathy noted  Neurologic: GCS 15, no focal deficits,   Psychiatric: Normal Affect    -------------------------------------------------- RESULTS -------------------------------------------------  I have personally reviewed all laboratory and imaging results for this patient. Results are listed below.      LABS:  Results for orders placed or performed during the hospital encounter of 06/16/22   COVID-19, Rapid    Specimen: Nasopharyngeal Swab   Result Value Ref Range    SARS-CoV-2, NAAT DETECTED (A) Not Detected   Rapid influenza A/B antigens    Specimen: Nasopharyngeal   Result Value Ref Range    Influenza A by PCR Not Detected Not Detected    Influenza B by PCR Not Detected Not Detected   CBC with Auto Differential   Result Value Ref Range    WBC 6.0 4.5 - 11.5 E9/L    RBC 3.90 3.50 - 5.50 E12/L    Hemoglobin 12.3 11.5 - 15.5 g/dL    Hematocrit 36.7 34.0 - 48.0 %    MCV 94.1 80.0 - 99.9 fL    MCH 31.5 26.0 - 35.0 pg    MCHC 33.5 32.0 - 34.5 %    RDW 14.1 11.5 - 15.0 fL    Platelets 465 014 - 105 E9/L    MPV 9.4 7.0 - 12.0 fL    Neutrophils % 72.5 43.0 - 80.0 %    Immature Granulocytes % 0.3 0.0 - 5.0 %    Lymphocytes % 13.0 (L) 20.0 - 42.0 %    Monocytes % 11.0 2.0 - 12.0 %    Eosinophils % 2.5 0.0 - 6.0 %    Basophils % 0.7 0.0 - 2.0 %    Neutrophils Absolute 4.35 1.80 - 7.30 E9/L    Immature Granulocytes # 0.02 E9/L    Lymphocytes Absolute 0.78 (L) 1.50 - 4.00 E9/L    Monocytes Absolute 0.66 0.10 - 0.95 E9/L    Eosinophils Absolute 0.15 0.05 - 0.50 E9/L    Basophils Absolute 0.04 0.00 - 0.20 E9/L   Comprehensive Metabolic Panel w/ Reflex to MG   Result Value Ref Range    Sodium 136 132 - 146 mmol/L    Potassium reflex Magnesium 3.7 3.5 - 5.0 mmol/L    Chloride 101 98 - 107 mmol/L    CO2 22 22 - 29 mmol/L    Anion Gap 13 7 - 16 mmol/L    Glucose 94 74 - 99 mg/dL    BUN 19 6 - 23 mg/dL    CREATININE 1.3 (H) 0.5 - 1.0 mg/dL    GFR Non-African American 41 >=60 mL/min/1.73    GFR African American 49     Calcium 9.0 8.6 - 10.2 mg/dL    Total Protein 7.3 6.4 - 8.3 g/dL    Albumin 4.0 3.5 - 5.2 g/dL    Total Bilirubin 0.4 0.0 - 1.2 mg/dL    Alkaline Phosphatase 68 35 - 104 U/L    ALT 10 0 - 32 U/L    AST 18 0 - 31 U/L   Urinalysis   Result Value Ref Range    Color, UA Yellow Straw/Yellow    Clarity, UA Clear Clear    Glucose, Ur Negative Negative mg/dL    Bilirubin Urine Negative Negative    Ketones, Urine Negative Negative mg/dL    Specific Gravity, UA 1.010 1.005 - 1.030    Blood, Urine SMALL (A) Negative    pH, UA 6.0 5.0 - 9.0    Protein, UA Negative Negative mg/dL    Urobilinogen, Urine 0.2 <2.0 E.U./dL    Nitrite, Urine Negative Negative    Leukocyte Esterase, Urine TRACE (A) Negative   Lactate, Sepsis   Result Value Ref Range    Lactic Acid, Sepsis 0.9 0.5 - 1.9 mmol/L   D-Dimer, Quantitative   Result Value Ref Range    D-Dimer, Quant 318 ng/mL DDU   Microscopic Urinalysis   Result Value Ref Range    WBC, UA 1-3 0 - 5 /HPF    RBC, UA 0-1 0 - 2 /HPF    Bacteria, UA RARE (A) None Seen /HPF   Fibrinogen   Result Value Ref Range    Fibrinogen 339 200 - 400 mg/dL   Lactate Dehydrogenase   Result Value Ref Range     (H) 135 - 214 U/L       RADIOLOGY:  Interpreted by Radiologist.  CTA PULMONARY W CONTRAST   Final Result   No pulmonary embolism, aortic aneurysm or dissection. No acute intrathoracic   findings.          XR CHEST (2 VW)   Final Result   Mild central airway thickening. Faint interstitial opacities. Broad   differential which includes infection and edema. Please correlate with   clinical presentation. RECOMMENDATION:   (Recommend upright PA and lateral chest radiographs 10-12 weeks after   resolution of patient's symptoms to ensure complete resolution of   radiographic findings.)             EKG:  This EKG is signed and interpreted by the EP. Time:   Rate:   Rhythm:   Interpretation:   Comparison:       ------------------------- NURSING NOTES AND VITALS REVIEWED ---------------------------   The nursing notes within the ED encounter and vital signs as below have been reviewed by myself. BP (!) 148/99   Pulse 84   Temp 98 °F (36.7 °C) (Oral)   Resp 20   Ht 5' 1\" (1.549 m)   Wt 168 lb (76.2 kg)   SpO2 100%   BMI 31.74 kg/m²   Oxygen Saturation Interpretation: Normal    The patients available past medical records and past encounters were reviewed.         ------------------------------ ED COURSE/MEDICAL DECISION MAKING----------------------  Medications   sodium chloride flush 0.9 % injection 5-40 mL (has no administration in time range)   sodium chloride flush 0.9 % injection 5-40 mL (has no administration in time range)   0.9 % sodium chloride infusion (has no administration in time range)   polyethylene glycol (GLYCOLAX) packet 17 g (has no administration in time range)   acetaminophen (TYLENOL) tablet 650 mg (has no administration in time range)     Or   acetaminophen (TYLENOL) suppository 650 mg (has no administration in time range)   trimethobenzamide (TIGAN) injection 200 mg (has no administration in time range)   heparin (porcine) injection 5,000 Units (has no administration in time range)   albuterol sulfate HFA (PROVENTIL;VENTOLIN;PROAIR) 108 (90 Base) MCG/ACT inhaler 2 puff (has no administration in time range)   dexamethasone (PF) (DECADRON) injection 6 mg (has no administration in time range)   ascorbic acid (VITAMIN C) tablet 500 mg (has no administration in time range)   zinc sulfate (ZINCATE) capsule 50 mg (has no administration in time range)   gabapentin (NEURONTIN) capsule 300 mg (has no administration in time range)   PARoxetine (PAXIL) tablet 20 mg (has no administration in time range)   mirtazapine (REMERON) tablet 15 mg (has no administration in time range)   topiramate (TOPAMAX) tablet 50 mg (has no administration in time range)   guaiFENesin (ROBITUSSIN) 100 MG/5ML oral solution 200 mg (has no administration in time range)   atorvastatin (LIPITOR) tablet 20 mg (has no administration in time range)   0.9 % sodium chloride bolus (0 mLs IntraVENous Stopped 6/17/22 0126)   acetaminophen (TYLENOL) tablet 650 mg (650 mg Oral Given 6/16/22 2349)   levoFLOXacin (LEVAQUIN) 750 MG/150ML infusion 750 mg (0 mg IntraVENous Stopped 6/17/22 0243)   dexamethasone (DECADRON) injection 10 mg (10 mg IntraVENous Given 6/17/22 0054)   morphine (PF) injection 2 mg (2 mg IntraVENous Given 6/17/22 0055)   ondansetron (ZOFRAN) injection 4 mg (4 mg IntraVENous Given 6/17/22 0055)   iopamidol (ISOVUE-370) 76 % injection 55 mL (55 mLs IntraVENous Given 6/17/22 0105)         ED COURSE:       Medical Decision Making:    Post op fever, xr with pna, pt hypoxic on ambulation, placed on o2, abx ordered, then covid returns pos. Discussed with surgery, ok to give dex post op. Admit for further care      This patient's ED course included: a personal history and physicial examination    This patient has remained hemodynamically stable during their ED course. Re-Evaluations:             Re-evaluation.   Patients symptoms show no change    Re-examination  6/17/22   1aM EDT          Vital Signs:   Vitals:    06/16/22 2119 06/17/22 0115 06/17/22 0215   BP: (!) 160/71 (!) 163/85 (!) 148/99   Pulse: (!) 116 91 84   Resp: 18 18 20   Temp: 98.8 °F (37.1 °C) 98.5 °F (36.9 °C) 98 °F (36.7 °C)   TempSrc: Oral  Oral   SpO2: 92% 95% 100%   Weight: 168 lb (76.2 kg)     Height: 5' 1\" (1.549 m)               Consultations:             peraza  maia    Critical Care: 35 min        Counseling: The emergency provider has spoken with the patient and discussed todays results, in addition to providing specific details for the plan of care and counseling regarding the diagnosis and prognosis. Questions are answered at this time and they are agreeable with the plan.       --------------------------------- IMPRESSION AND DISPOSITION ---------------------------------    IMPRESSION  1. Pneumonia due to infectious organism, unspecified laterality, unspecified part of lung    2. Hypoxia    3. COVID-19        DISPOSITION  Disposition: Admit to telemetry  Patient condition is stable    NOTE: This report was transcribed using voice recognition software.  Every effort was made to ensure accuracy; however, inadvertent computerized transcription errors may be present        Fernando Rojas MD  06/17/22 0302

## 2022-06-19 LAB
ORGANISM: ABNORMAL
URINE CULTURE, ROUTINE: ABNORMAL

## 2022-06-22 LAB
BLOOD CULTURE, ROUTINE: NORMAL
CULTURE, BLOOD 2: NORMAL

## 2022-07-25 ENCOUNTER — HOSPITAL ENCOUNTER (OUTPATIENT)
Dept: GENERAL RADIOLOGY | Age: 72
Discharge: HOME | End: 2022-07-27
Payer: MEDICARE

## 2022-07-25 ENCOUNTER — HOSPITAL ENCOUNTER (OUTPATIENT)
Dept: ULTRASOUND IMAGING | Age: 72
Discharge: HOME | End: 2022-07-27
Payer: MEDICARE

## 2022-07-25 ENCOUNTER — HOSPITAL ENCOUNTER (OUTPATIENT)
Age: 72
Discharge: HOME | End: 2022-07-27
Payer: MEDICARE

## 2022-07-25 DIAGNOSIS — R31.21 ASYMPTOMATIC MICROSCOPIC HEMATURIA: ICD-10-CM

## 2022-07-25 PROCEDURE — 74018 RADEX ABDOMEN 1 VIEW: CPT

## 2022-07-25 PROCEDURE — 76770 US EXAM ABDO BACK WALL COMP: CPT

## 2023-09-15 ENCOUNTER — OFFICE VISIT (OUTPATIENT)
Dept: NEUROLOGY | Age: 73
End: 2023-09-15

## 2023-09-15 VITALS
DIASTOLIC BLOOD PRESSURE: 94 MMHG | WEIGHT: 160 LBS | TEMPERATURE: 97.7 F | SYSTOLIC BLOOD PRESSURE: 158 MMHG | HEART RATE: 85 BPM | BODY MASS INDEX: 30.23 KG/M2 | OXYGEN SATURATION: 96 %

## 2023-09-15 DIAGNOSIS — G35 MS (MULTIPLE SCLEROSIS) (HCC): ICD-10-CM

## 2023-09-15 DIAGNOSIS — R41.841 COGNITIVE COMMUNICATION DEFICIT: Primary | ICD-10-CM

## 2023-09-15 RX ORDER — OXYBUTYNIN CHLORIDE 10 MG/1
1 TABLET, EXTENDED RELEASE ORAL DAILY
COMMUNITY
Start: 2023-06-28

## 2023-09-15 RX ORDER — PAROXETINE 10 MG/1
10 TABLET, FILM COATED ORAL EVERY MORNING
COMMUNITY

## 2023-09-15 RX ORDER — PAROXETINE 30 MG/1
30 TABLET, FILM COATED ORAL EVERY MORNING
COMMUNITY

## 2023-09-15 NOTE — PROGRESS NOTES
Nickolas Bethea is a 72 y.o. right handed woman    Past Medical History:     Past Medical History:   Diagnosis Date    Allergic rhinitis     Anxiety     Dyslexia     GERD (gastroesophageal reflux disease)     Hyperlipidemia     Hypertension     Multiple sclerosis ([address]) 1980    Osteoporosis     Sleep disorder        Past Surgical History:     Past Surgical History:   Procedure Laterality Date    COLECTOMY      diverticulitis     HERNIA REPAIR      IN EGD TRANSORAL BIOPSY SINGLE/MULTIPLE N/A 9/26/2018    EGD BIOPSY performed by Nhung Romero MD at [facility] EXTRACTION         Allergies:     Tetracyclines & related, Z-ana paula [azithromycin], and Bee venom    Medications:     Prior to Admission medications    Medication Sig Start Date End Date Taking?  Authorizing Provider   PARoxetine (PAXIL) 10 MG tablet Take 1 tablet by mouth every morning Take with 30 mg tablet   Yes Historical Provider, MD   PARoxetine (PAXIL) 30 MG tablet Take 1 tablet by mouth every morning Take with the 10 mg tablet   Yes Historical Provider, MD   oxybutynin (DITROPAN-XL) 10 MG extended release tablet Take 1 tablet by mouth daily 6/28/23  Yes Historical Provider, MD   losartan (COZAAR) 25 MG tablet Take 1 tablet by mouth daily   Yes Historical Provider, MD Arturo Dee [facility] by Does not apply route Patient cannot walk 200 ft without stopping to rest.    Expiration *1/10/24 1/9/19  Yes KAREN Boston - CNP   diphenhydrAMINE (BENADRYL) 25 MG capsule Take 1 capsule by mouth every 6 hours as needed for Itching   Yes Historical Provider, MD   sodium chloride (OCEAN, BABY AYR) 0.65 % nasal spray 1 spray by Nasal route as needed for Congestion   Yes Historical Provider, MD   albuterol sulfate HFA (PROVENTIL;VENTOLIN;PROAIR) 108 (90 Base) MCG/ACT inhaler Inhale 2 puffs into the lungs every 6 hours  Patient not taking: Reported on 9/15/2023 6/17/22   Mony Riddle

## (undated) DEVICE — CANNULA NSL ORAL AD FOR CAPNOFLEX CO2 O2 AIRLFE

## (undated) DEVICE — FORCEPS BX L160CM JAW DIA2.4MM YEL L CAP W/ NDL DISP RAD